# Patient Record
Sex: MALE | Race: OTHER | NOT HISPANIC OR LATINO | URBAN - METROPOLITAN AREA
[De-identification: names, ages, dates, MRNs, and addresses within clinical notes are randomized per-mention and may not be internally consistent; named-entity substitution may affect disease eponyms.]

---

## 2020-09-28 ENCOUNTER — INPATIENT (INPATIENT)
Facility: HOSPITAL | Age: 75
LOS: 0 days | Discharge: HOME | DRG: 244 | End: 2020-09-29
Attending: INTERNAL MEDICINE | Admitting: INTERNAL MEDICINE
Payer: COMMERCIAL

## 2020-09-28 VITALS — RESPIRATION RATE: 20 BRPM | DIASTOLIC BLOOD PRESSURE: 76 MMHG | HEART RATE: 44 BPM | SYSTOLIC BLOOD PRESSURE: 191 MMHG

## 2020-09-28 DIAGNOSIS — Z95.1 PRESENCE OF AORTOCORONARY BYPASS GRAFT: Chronic | ICD-10-CM

## 2020-09-28 DIAGNOSIS — Z98.49 CATARACT EXTRACTION STATUS, UNSPECIFIED EYE: Chronic | ICD-10-CM

## 2020-09-28 DIAGNOSIS — I49.9 CARDIAC ARRHYTHMIA, UNSPECIFIED: ICD-10-CM

## 2020-09-28 LAB
A1C WITH ESTIMATED AVERAGE GLUCOSE RESULT: 8.6 % — HIGH (ref 4–5.6)
ALBUMIN SERPL ELPH-MCNC: 4 G/DL — SIGNIFICANT CHANGE UP (ref 3.3–5)
ALP SERPL-CCNC: 114 U/L — SIGNIFICANT CHANGE UP (ref 40–120)
ALT FLD-CCNC: 25 U/L — SIGNIFICANT CHANGE UP (ref 10–45)
ANION GAP SERPL CALC-SCNC: 12 MMOL/L — SIGNIFICANT CHANGE UP (ref 5–17)
APTT BLD: 35.4 SEC — SIGNIFICANT CHANGE UP (ref 27.5–35.5)
AST SERPL-CCNC: 20 U/L — SIGNIFICANT CHANGE UP (ref 10–40)
BILIRUB SERPL-MCNC: 0.5 MG/DL — SIGNIFICANT CHANGE UP (ref 0.2–1.2)
BLD GP AB SCN SERPL QL: NEGATIVE — SIGNIFICANT CHANGE UP
BUN SERPL-MCNC: 20 MG/DL — SIGNIFICANT CHANGE UP (ref 7–23)
CALCIUM SERPL-MCNC: 9.2 MG/DL — SIGNIFICANT CHANGE UP (ref 8.4–10.5)
CHLORIDE SERPL-SCNC: 108 MMOL/L — SIGNIFICANT CHANGE UP (ref 96–108)
CK MB CFR SERPL CALC: 2.4 NG/ML — SIGNIFICANT CHANGE UP (ref 0–6.7)
CK SERPL-CCNC: 61 U/L — SIGNIFICANT CHANGE UP (ref 30–200)
CO2 SERPL-SCNC: 23 MMOL/L — SIGNIFICANT CHANGE UP (ref 22–31)
CREAT SERPL-MCNC: 1.18 MG/DL — SIGNIFICANT CHANGE UP (ref 0.5–1.3)
ESTIMATED AVERAGE GLUCOSE: 200 MG/DL — HIGH (ref 68–114)
GLUCOSE BLDC GLUCOMTR-MCNC: 139 MG/DL — HIGH (ref 70–99)
GLUCOSE BLDC GLUCOMTR-MCNC: 141 MG/DL — HIGH (ref 70–99)
GLUCOSE SERPL-MCNC: 139 MG/DL — HIGH (ref 70–99)
HCT VFR BLD CALC: 41.1 % — SIGNIFICANT CHANGE UP (ref 39–50)
HGB BLD-MCNC: 12.9 G/DL — LOW (ref 13–17)
INR BLD: 1.12 RATIO — SIGNIFICANT CHANGE UP (ref 0.88–1.16)
MAGNESIUM SERPL-MCNC: 1.8 MG/DL — SIGNIFICANT CHANGE UP (ref 1.6–2.6)
MCHC RBC-ENTMCNC: 28.7 PG — SIGNIFICANT CHANGE UP (ref 27–34)
MCHC RBC-ENTMCNC: 31.4 GM/DL — LOW (ref 32–36)
MCV RBC AUTO: 91.5 FL — SIGNIFICANT CHANGE UP (ref 80–100)
NRBC # BLD: 0 /100 WBCS — SIGNIFICANT CHANGE UP (ref 0–0)
NT-PROBNP SERPL-SCNC: 3408 PG/ML — HIGH (ref 0–300)
PHOSPHATE SERPL-MCNC: 2.7 MG/DL — SIGNIFICANT CHANGE UP (ref 2.5–4.5)
PLATELET # BLD AUTO: 227 K/UL — SIGNIFICANT CHANGE UP (ref 150–400)
POTASSIUM SERPL-MCNC: 4.2 MMOL/L — SIGNIFICANT CHANGE UP (ref 3.5–5.3)
POTASSIUM SERPL-SCNC: 4.2 MMOL/L — SIGNIFICANT CHANGE UP (ref 3.5–5.3)
PROT SERPL-MCNC: 6.8 G/DL — SIGNIFICANT CHANGE UP (ref 6–8.3)
PROTHROM AB SERPL-ACNC: 13.2 SEC — SIGNIFICANT CHANGE UP (ref 10.6–13.6)
RBC # BLD: 4.49 M/UL — SIGNIFICANT CHANGE UP (ref 4.2–5.8)
RBC # FLD: 12.8 % — SIGNIFICANT CHANGE UP (ref 10.3–14.5)
RH IG SCN BLD-IMP: POSITIVE — SIGNIFICANT CHANGE UP
SARS-COV-2 RNA SPEC QL NAA+PROBE: SIGNIFICANT CHANGE UP
SODIUM SERPL-SCNC: 143 MMOL/L — SIGNIFICANT CHANGE UP (ref 135–145)
TROPONIN T, HIGH SENSITIVITY RESULT: 23 NG/L — SIGNIFICANT CHANGE UP (ref 0–51)
WBC # BLD: 9.53 K/UL — SIGNIFICANT CHANGE UP (ref 3.8–10.5)
WBC # FLD AUTO: 9.53 K/UL — SIGNIFICANT CHANGE UP (ref 3.8–10.5)

## 2020-09-28 PROCEDURE — 93010 ELECTROCARDIOGRAM REPORT: CPT

## 2020-09-28 PROCEDURE — 99223 1ST HOSP IP/OBS HIGH 75: CPT | Mod: 57

## 2020-09-28 PROCEDURE — 93306 TTE W/DOPPLER COMPLETE: CPT | Mod: 26

## 2020-09-28 PROCEDURE — 99291 CRITICAL CARE FIRST HOUR: CPT

## 2020-09-28 RX ORDER — DEXTROSE 50 % IN WATER 50 %
25 SYRINGE (ML) INTRAVENOUS ONCE
Refills: 0 | Status: DISCONTINUED | OUTPATIENT
Start: 2020-09-28 | End: 2020-09-29

## 2020-09-28 RX ORDER — HYDRALAZINE HCL 50 MG
25 TABLET ORAL ONCE
Refills: 0 | Status: COMPLETED | OUTPATIENT
Start: 2020-09-28 | End: 2020-09-28

## 2020-09-28 RX ORDER — ALBUTEROL 90 UG/1
2 AEROSOL, METERED ORAL EVERY 6 HOURS
Refills: 0 | Status: DISCONTINUED | OUTPATIENT
Start: 2020-09-28 | End: 2020-09-29

## 2020-09-28 RX ORDER — ASPIRIN/CALCIUM CARB/MAGNESIUM 324 MG
81 TABLET ORAL DAILY
Refills: 0 | Status: DISCONTINUED | OUTPATIENT
Start: 2020-09-28 | End: 2020-09-28

## 2020-09-28 RX ORDER — DEXTROSE 50 % IN WATER 50 %
15 SYRINGE (ML) INTRAVENOUS ONCE
Refills: 0 | Status: DISCONTINUED | OUTPATIENT
Start: 2020-09-28 | End: 2020-09-29

## 2020-09-28 RX ORDER — CLOPIDOGREL BISULFATE 75 MG/1
75 TABLET, FILM COATED ORAL DAILY
Refills: 0 | Status: DISCONTINUED | OUTPATIENT
Start: 2020-09-21 | End: 2020-09-29

## 2020-09-28 RX ORDER — ASPIRIN/CALCIUM CARB/MAGNESIUM 324 MG
81 TABLET ORAL DAILY
Refills: 0 | Status: DISCONTINUED | OUTPATIENT
Start: 2020-09-28 | End: 2020-09-29

## 2020-09-28 RX ORDER — CHLORHEXIDINE GLUCONATE 213 G/1000ML
1 SOLUTION TOPICAL
Refills: 0 | Status: DISCONTINUED | OUTPATIENT
Start: 2020-09-28 | End: 2020-09-29

## 2020-09-28 RX ORDER — TAMSULOSIN HYDROCHLORIDE 0.4 MG/1
0.4 CAPSULE ORAL AT BEDTIME
Refills: 0 | Status: DISCONTINUED | OUTPATIENT
Start: 2020-09-28 | End: 2020-09-29

## 2020-09-28 RX ORDER — GLUCAGON INJECTION, SOLUTION 0.5 MG/.1ML
1 INJECTION, SOLUTION SUBCUTANEOUS ONCE
Refills: 0 | Status: DISCONTINUED | OUTPATIENT
Start: 2020-09-28 | End: 2020-09-29

## 2020-09-28 RX ORDER — INSULIN LISPRO 100/ML
VIAL (ML) SUBCUTANEOUS
Refills: 0 | Status: DISCONTINUED | OUTPATIENT
Start: 2020-09-28 | End: 2020-09-29

## 2020-09-28 RX ORDER — INSULIN LISPRO 100/ML
VIAL (ML) SUBCUTANEOUS AT BEDTIME
Refills: 0 | Status: DISCONTINUED | OUTPATIENT
Start: 2020-09-28 | End: 2020-09-29

## 2020-09-28 RX ORDER — DEXTROSE 50 % IN WATER 50 %
12.5 SYRINGE (ML) INTRAVENOUS ONCE
Refills: 0 | Status: DISCONTINUED | OUTPATIENT
Start: 2020-09-28 | End: 2020-09-29

## 2020-09-28 RX ORDER — CLOPIDOGREL BISULFATE 75 MG/1
75 TABLET, FILM COATED ORAL DAILY
Refills: 0 | Status: DISCONTINUED | OUTPATIENT
Start: 2020-09-28 | End: 2020-09-28

## 2020-09-28 RX ORDER — ATORVASTATIN CALCIUM 80 MG/1
40 TABLET, FILM COATED ORAL AT BEDTIME
Refills: 0 | Status: DISCONTINUED | OUTPATIENT
Start: 2020-09-28 | End: 2020-09-29

## 2020-09-28 RX ORDER — CHLORHEXIDINE GLUCONATE 213 G/1000ML
1 SOLUTION TOPICAL
Refills: 0 | Status: COMPLETED | OUTPATIENT
Start: 2020-09-28 | End: 2020-09-29

## 2020-09-28 RX ORDER — TIOTROPIUM BROMIDE 18 UG/1
1 CAPSULE ORAL; RESPIRATORY (INHALATION) DAILY
Refills: 0 | Status: DISCONTINUED | OUTPATIENT
Start: 2020-09-28 | End: 2020-09-29

## 2020-09-28 RX ORDER — FUROSEMIDE 40 MG
20 TABLET ORAL ONCE
Refills: 0 | Status: COMPLETED | OUTPATIENT
Start: 2020-09-28 | End: 2020-09-28

## 2020-09-28 RX ORDER — SODIUM CHLORIDE 9 MG/ML
1000 INJECTION, SOLUTION INTRAVENOUS
Refills: 0 | Status: DISCONTINUED | OUTPATIENT
Start: 2020-09-28 | End: 2020-09-29

## 2020-09-28 RX ORDER — MONTELUKAST 4 MG/1
10 TABLET, CHEWABLE ORAL DAILY
Refills: 0 | Status: DISCONTINUED | OUTPATIENT
Start: 2020-09-28 | End: 2020-09-29

## 2020-09-28 RX ORDER — MAGNESIUM SULFATE 500 MG/ML
2 VIAL (ML) INJECTION ONCE
Refills: 0 | Status: COMPLETED | OUTPATIENT
Start: 2020-09-28 | End: 2020-09-28

## 2020-09-28 RX ADMIN — TIOTROPIUM BROMIDE 1 CAPSULE(S): 18 CAPSULE ORAL; RESPIRATORY (INHALATION) at 18:54

## 2020-09-28 RX ADMIN — Medication 25 MILLIGRAM(S): at 18:27

## 2020-09-28 RX ADMIN — Medication 20 MILLIGRAM(S): at 20:45

## 2020-09-28 RX ADMIN — ATORVASTATIN CALCIUM 40 MILLIGRAM(S): 80 TABLET, FILM COATED ORAL at 21:05

## 2020-09-28 RX ADMIN — Medication 50 GRAM(S): at 20:45

## 2020-09-28 RX ADMIN — TAMSULOSIN HYDROCHLORIDE 0.4 MILLIGRAM(S): 0.4 CAPSULE ORAL at 21:05

## 2020-09-28 RX ADMIN — CHLORHEXIDINE GLUCONATE 1 APPLICATION(S): 213 SOLUTION TOPICAL at 20:46

## 2020-09-28 NOTE — PATIENT PROFILE ADULT - NSPROPASSIVESMOKEEXPOSURE_GEN_A_NUR
[] [address]  [address]    [phone]  F: [phone]   [] [address] [facility] [address]   [address]  P: [phone]  F: [phone]  [x] [place]  [address] [facility]  P: [phone]  F: [phone]  [] [address]  [facility]   [place]: [phone]  F: [phone]   [] [place] [id number] [facility] [address]  [place]: [phone]   F: [phone]     Physical Therapy Cancel/No Show note    Date: 2020  Patient: Arabella Bishop  : 1986  MRN: [id number]    Cancels/No Shows to date:     For today's appointment patient:    [x]  Cancelled    [] Rescheduled appointment    [] No-show     Reason given by patient:    [x]  Patient ill    []  Conflicting appointment    [] No transportation      [] Conflict with work    [] No reason given    [] Weather related    [] Other:      Comments:  Pt has strep    [] Next appointment was confirmed    Electronically signed by: Michelle Salas No

## 2020-09-28 NOTE — H&P ADULT - NSHPLABSRESULTS_GEN_ALL_CORE
12.9   9.53  )-----------( 227      ( 28 Sep 2020 17:13 )             41.1     09-28    143  |  108  |  20  ----------------------------<  139<H>  4.2   |  23  |  1.18    Ca    9.2      28 Sep 2020 17:13  Phos  2.7     09-28  Mg     1.8     09-28    TPro  6.8  /  Alb  4.0  /  TBili  0.5  /  DBili  x   /  AST  20  /  ALT  25  /  AlkPhos  114  09-28    Creatine Kinase, Serum: 61 U/L (09.28.20 @ 17:13)  Serum Pro-Brain Natriuretic Peptide: 3408 pg/mL (09.28.20 @ 17:13) 12.9   9.53  )-----------( 227      ( 28 Sep 2020 17:13 )             41.1     09-28    143  |  108  |  20  ----------------------------<  139<H>  4.2   |  23  |  1.18    Ca    9.2      28 Sep 2020 17:13  Phos  2.7     09-28  Mg     1.8     09-28    TPro  6.8  /  Alb  4.0  /  TBili  0.5  /  DBili  x   /  AST  20  /  ALT  25  /  AlkPhos  114  09-28    Creatine Kinase, Serum: 61 U/L (09.28.20 @ 17:13)  Serum Pro-Brain Natriuretic Peptide: 3408 pg/mL (09.28.20 @ 17:13)    EKG:  Bradycardia, 2:1 rhythm, RBBB 12.9   9.53  )-----------( 227      ( 28 Sep 2020 17:13 )             41.1     09-28    143  |  108  |  20  ----------------------------<  139<H>  4.2   |  23  |  1.18    Ca    9.2      28 Sep 2020 17:13  Phos  2.7     09-28  Mg     1.8     09-28    TPro  6.8  /  Alb  4.0  /  TBili  0.5  /  DBili  x   /  AST  20  /  ALT  25  /  AlkPhos  114  09-28    Creatine Kinase, Serum: 61 U/L (09.28.20 @ 17:13)  Serum Pro-Brain Natriuretic Peptide: 3408 pg/mL (09.28.20 @ 17:13)    TSH WNL    EKG:  Bradycardia, 2:1 rhythm, RBBB 12.9   9.53  )-----------( 227      ( 28 Sep 2020 17:13 )             41.1     09-28    143  |  108  |  20  ----------------------------<  139<H>  4.2   |  23  |  1.18    Ca    9.2      28 Sep 2020 17:13  Phos  2.7     09-28  Mg     1.8     09-28    TPro  6.8  /  Alb  4.0  /  TBili  0.5  /  DBili  x   /  AST  20  /  ALT  25  /  AlkPhos  114  09-28    Creatine Kinase, Serum: 61 U/L (09.28.20 @ 17:13)  Serum Pro-Brain Natriuretic Peptide: 3408 pg/mL (09.28.20 @ 17:13)    TSH WNL    EKG:  Bradycardia, 2:1 rhythm, RBBB, Left anterior fascicular block.

## 2020-09-28 NOTE — H&P ADULT - NSICDXPASTMEDICALHX_GEN_ALL_CORE_FT
PAST MEDICAL HISTORY:  Asthma     BPH (benign prostatic hyperplasia)     Cataract     GERD (gastroesophageal reflux disease)     HLD (hyperlipidemia)     HTN (hypertension)     Obesity     Prostate cancer Radiation tx    Type 2 diabetes mellitus     Umbilical hernia

## 2020-09-28 NOTE — CONSULT NOTE ADULT - REASON FOR ADMISSION
LUIS CALLING FROM [facility] / WANT TO KNOW IF SURGERY /  OUT PATIENT  / OR IN PATIENT / PLS ADV Bradycardia

## 2020-09-28 NOTE — H&P ADULT - NSHPREVIEWOFSYSTEMS_GEN_ALL_CORE
CONSTITUTIONAL: + weakness. No fevers or chills  EYES/ENT: No visual changes;  No throat pain   NECK: No pain  RESPIRATORY: No cough, hemoptysis  CARDIOVASCULAR: No chest pain or palpitations. + Chest tightness. No lower extremity swelling.   GASTROINTESTINAL: No abdominal or epigastric pain. No nausea, vomiting. No diarrhea or constipation. No melena or hematochezia.  GENITOURINARY: No dysuria  NEUROLOGICAL: No numbness or tingling, no headaches. + dizziness  All other review of systems is negative unless indicated above.

## 2020-09-28 NOTE — CONSULT NOTE ADULT - ASSESSMENT
75 year old Obese former smoking male, primary language Czech PMHx asthma, T2DM, HTN, HLD CAD s/p 1 vessel  CABG (2016) LAD, transfer to [facility] for symptomatic bradycardia dizziness, weakness, chest tightness, and JACOBO for 2 weeks. EKG with CHB with ventricular escape rate in the 40's    #1 EKG with CHB with ventricular escape rate in the 40's  monitor on telemetry per CICU care   NPO after midnight for pacing device implant tomorrow   follow up ECHO check LV function   type and screen is done  Hibiclens to chest wall 10 pm and 6 am   hold heparin/Lovenox  products in AM   No heparin or Lovenox products after implant due to high risk of pocket hematoma    [id number]   75 year old Obese former smoking male, primary language Latvian PMHx asthma, T2DM, HTN, HLD CAD s/p vessel  CABG (2016)  transfer to [facility] for symptomatic bradycardia dizziness, weakness, chest tightness, and JACOBO for 2 weeks. EKG with CHB with ventricular escape rate in the 40's    #1 CHB with ventricular escape rate in the 40's  - monitor on telemetry per CICU care   - NPO after midnight for pacing device implant tomorrow   - follow up ECHO check LV function   - type and screen is done  - Hibiclens to chest wall 10 pm and 6 am   - hold heparin/Lovenox  products in AM   - No heparin or Lovenox products after implant due to high risk of pocket hematoma  - please obtain 12 lead EKG  [id number]

## 2020-09-28 NOTE — H&P ADULT - NSHPPHYSICALEXAM_GEN_ALL_CORE
PHYSICAL EXAM:  Vital Signs Last 24 Hrs  T(C): 36.5 (09-28-20 @ 16:45)  T(F): 97.7 (09-28-20 @ 16:45), Max: 97.7 (09-28-20 @ 16:45)  HR: 40 (09-28-20 @ 17:30) (40 - 44)  BP: 161/61 (09-28-20 @ 17:30)  BP(mean): 96 (09-28-20 @ 17:30) (90 - 118)  RR: 22 (09-28-20 @ 17:30) (20 - 22)  SpO2: 99% (09-28-20 @ 17:30) (98% - 99%)  Wt(kg): --    Constitutional: NAD, awake and alert  EYES: EOMI. R pupil w post-surgical changes, nonreactive  ENT:  Normal Hearing, no tonsillar exudates, moist mucous membranes  Neck: Soft and supple , no thyromegaly   Respiratory: Scattered wheezes bilaterally  Cardiovascular: S1 and S2, slow, regular rhythm, no Murmurs, gallops or rubs, no JVD.   Gastrointestinal: Bowel Sounds present, soft, nontender, nondistended, no guarding, no rebound  Extremities: No cyanosis or clubbing; warm to touch. No LE edema.   Vascular: 2+ peripheral pulses lower ex. Varicose veins  Neurological: No focal deficits, CN II-XII intact bilaterally, sensation to light touch intact in all extremities.  Musculoskeletal: 5/5 strength b/l upper and lower extremities; no joint swelling.  Skin: No rashes. Well-healed   Psych:  AAOx3  HEME: no bruises, no nose bleeds PHYSICAL EXAM:  Vital Signs Last 24 Hrs  T(C): 36.5 (09-28-20 @ 16:45)  T(F): 97.7 (09-28-20 @ 16:45), Max: 97.7 (09-28-20 @ 16:45)  HR: 40 (09-28-20 @ 17:30) (40 - 44)  BP: 161/61 (09-28-20 @ 17:30)  BP(mean): 96 (09-28-20 @ 17:30) (90 - 118)  RR: 22 (09-28-20 @ 17:30) (20 - 22)  SpO2: 99% (09-28-20 @ 17:30) (98% - 99%)  Wt(kg): --    Constitutional: NAD, awake and alert  EYES: EOMI. R pupil w post-surgical changes, nonreactive  ENT:  Normal Hearing, no tonsillar exudates, moist mucous membranes  Neck: Soft and supple , no thyromegaly   Respiratory: Scattered wheezes bilaterally  Cardiovascular: S1 and S2, slow, regular rhythm, no Murmurs, gallops or rubs, + JVD with +HJR  Gastrointestinal: Bowel Sounds present, soft, nontender, nondistended, no guarding, no rebound  Extremities: No cyanosis or clubbing; warm to touch. No LE edema.   Vascular: 2+ peripheral pulses lower ex. Varicose veins  Neurological: No focal deficits, CN II-XII intact bilaterally, sensation to light touch intact in all extremities.  Musculoskeletal: 5/5 strength b/l upper and lower extremities; no joint swelling.  Skin: No rashes. Well-healed   Psych:  AAOx3  HEME: no bruises, no nose bleeds

## 2020-09-28 NOTE — H&P ADULT - ATTENDING COMMENTS
I have personally seen, examined and participated in the care of this patient. I have reviewed all pertinent clinical information, including history, physical exam, plan and the resident's note. I agree with the resident's note with the following additions:    Admitted with AV block with RBBB and LAFB - for pacemaker tomorrow  Narrow QRS with ventricular rate in 40s, hypertensive and asymptomatic - defer temporary pacemaker  Check TTE  Hypertensive - hold beta blocker, start Hydralazine prn  O2 sats mid to high 90s on room air  NPO  Normal renal function, minimally volume up on exam - challenge with Lasix  H/H low but acceptable  Afebrile, no antibiotics  Sugars controlled    The patient required critical care management and I personally provided 35 minutes of non-continuous care to the patient concurrently with the resident/fellow/nurse practitioner, excluding separate procedures and time spent teaching, in addition to discussing the patient and plan at length with the CICU staff and helping coordinate care.

## 2020-09-28 NOTE — CONSULT NOTE ADULT - SUBJECTIVE AND OBJECTIVE BOX
Chief Complaint :  dizziness and SOB x 2 weeks     HPI: This is a 75 year old Obese former smoking male, primary language Ecuadorean PMHx asthma, T2DM, HTN, HLD CAD s/p 1 vessel  CABG (2016) LAD, presents to [facility] from  Dr  office for symptomatic bradycardia.  Patient  and girlfriend report patient with N/V last week with diarrhea this week. Patient states SOB x 2 weeks, chest pain was not like the chest pain he had before the CABG in 2016. Reports having felt lightheadedness, dizziness, weakness, chest tightness, and shortness of breath with exertion for 3 days. Yesterday, he missed [employer] because he was feeling ill. Reports feeling like he was having an asthma attack. This AM, went to his doctor, who sent him to  due to bradycardia. At OSH, noted to be bradycardic to 40, BP 150s/60s. Received nebulizer for asthma, reports he felt better after this. Transferred to [facility] CICU for EP eval.     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 1000 milliLiter(s) (50 mL/Hr) IV Continuous <Continuous>  dextrose 50% Injectable 12.5 Gram(s) IV Push once  dextrose 50% Injectable 25 Gram(s) IV Push once  dextrose 50% Injectable 25 Gram(s) IV Push once  hydrALAZINE 25 milliGRAM(s) Oral once  insulin lispro (HumaLOG) corrective regimen sliding scale   SubCutaneous three times a day before meals  insulin lispro (HumaLOG) corrective regimen sliding scale   SubCutaneous at bedtime  montelukast 10 milliGRAM(s) Oral daily  tamsulosin 0.4 milliGRAM(s) Oral at bedtime  tiotropium 18 MICROgram(s) Capsule 1 Capsule(s) Inhalation daily    MEDICATIONS  (PRN):  ALBUTerol    90 MICROgram(s) HFA Inhaler 2 Puff(s) Inhalation every 6 hours PRN Shortness of Breath and/or Wheezing  dextrose 40% Gel 15 Gram(s) Oral once PRN Blood Glucose LESS THAN 70 milliGRAM(s)/deciliter  glucagon  Injectable 1 milliGRAM(s) IntraMuscular once PRN Glucose LESS THAN 70 milligrams/deciliter    Allergies   No Known Allergies    Intolerances   REVIEW OF SYSTEM:    Constitutional: denies fever, chills, fatigue  Neuro: denies headache, numbness, + weakness, + dizziness  Resp: denies cough, wheezing, + shortness of breath  CVS: + chest pain, palpitations, leg swelling  GI: denies abdominal pain, nausea, vomiting, diarrhea   : denies dysuria, frequency, incontinence  Skin: denies itching, burning, rashes, or lesions   Msk: denies joint pain or swelling     Vital Signs Last 24 Hrs  T(C): 36.5 (28 Sep 2020 16:45), Max: 36.5 (28 Sep 2020 16:45)  T(F): 97.7 (28 Sep 2020 16:45), Max: 97.7 (28 Sep 2020 16:45)  HR: 42 (28 Sep 2020 18:00) (40 - 44)  BP: 170/77 (28 Sep 2020 18:00) (161/61 - 191/76)  BP(mean): 119 (28 Sep 2020 18:00) (90 - 119)  RR: 22 (28 Sep 2020 18:00) (20 - 22)  SpO2: 98% (28 Sep 2020 18:00) (98% - 99%)    Physical Exam:  General : Obese lying in hospital  and no acute distress  Neuro : Alert and oriented x 3, no focal deficits  HEENT : Sclera clear, no JVD, no Lymphadenopathy no carotid bruits, neck supple  Lungs: significantly diminished throughout , no wheezing, no rhonchi  Cardiovascular : + 1 +2, RRR, no murmurs, bradycardic  GI : obese abdomen soft, NT, ND, + BS   : no suprapubic tenderness  Extremities : No edema, + 2 DP and +2 PT, feet warm   Skin : warm dry    TELE: CHB with ventricular escape rate in the 40's  EKG: SR CHB with ventricular escape rate in the 40's,  ms    LABS:                        12.9   9.53  )-----------( 227      ( 28 Sep 2020 17:13 )             41.1     09-28    143  |  108  |  20  ----------------------------<  139<H>  4.2   |  23  |  1.18    Ca    9.2      28 Sep 2020 17:13  Phos  2.7     09-28  Mg     1.8     09-28    TPro  6.8  /  Alb  4.0  /  TBili  0.5  /  DBili  x   /  AST  20  /  ALT  25  /  AlkPhos  114  09-28    PT/INR - ( 28 Sep 2020 17:17 )   PT: 13.2 sec;   INR: 1.12 ratio      PTT - ( 28 Sep 2020 17:17 )  PTT:35.4 sec  Type + Screen (09.28.20 @ 17:22)   ABO Interpretation: O   Rh Interpretation: Positive   Antibody Screen: Negative     RADIOLOGY & ADDITIONAL STUDIES:               Chief Complaint :  dizziness and SOB x 2 weeks     HPI: This is a 75 year old Obese former smoking male, primary language Egyptian PMHx asthma, T2DM, HTN, HLD CAD s/p 2 vessel  CABG (2016)   , presents to [facility] from  Dr  office for symptomatic bradycardia.  Patient  and girlfriend report patient with N/V last week with diarrhea this week. Patient states SOB x 2 weeks, chest pain was not like the chest pain he had before the CABG in 2016. Reports having felt lightheadedness, dizziness, weakness, chest tightness, and shortness of breath with exertion for 3 days. Yesterday, he missed [employer] because he was feeling ill. Reports feeling like he was having an asthma attack. This AM, went to his doctor, who sent him to  due to bradycardia. At OSH, noted to be bradycardic to 40, BP 150s/60s. Received nebulizer for asthma, reports he felt better after this. Transferred to [facility] CICU for EP eval.     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 1000 milliLiter(s) (50 mL/Hr) IV Continuous <Continuous>  dextrose 50% Injectable 12.5 Gram(s) IV Push once  dextrose 50% Injectable 25 Gram(s) IV Push once  dextrose 50% Injectable 25 Gram(s) IV Push once  hydrALAZINE 25 milliGRAM(s) Oral once  insulin lispro (HumaLOG) corrective regimen sliding scale   SubCutaneous three times a day before meals  insulin lispro (HumaLOG) corrective regimen sliding scale   SubCutaneous at bedtime  montelukast 10 milliGRAM(s) Oral daily  tamsulosin 0.4 milliGRAM(s) Oral at bedtime  tiotropium 18 MICROgram(s) Capsule 1 Capsule(s) Inhalation daily    MEDICATIONS  (PRN):  ALBUTerol    90 MICROgram(s) HFA Inhaler 2 Puff(s) Inhalation every 6 hours PRN Shortness of Breath and/or Wheezing  dextrose 40% Gel 15 Gram(s) Oral once PRN Blood Glucose LESS THAN 70 milliGRAM(s)/deciliter  glucagon  Injectable 1 milliGRAM(s) IntraMuscular once PRN Glucose LESS THAN 70 milligrams/deciliter    Allergies   No Known Allergies    Intolerances   REVIEW OF SYSTEM:    Constitutional: denies fever, chills, fatigue  Neuro: denies headache, numbness, + weakness, + dizziness  Resp: denies cough, wheezing, + shortness of breath  CVS: + chest pain, palpitations, leg swelling  GI: denies abdominal pain, nausea, vomiting, diarrhea   : denies dysuria, frequency, incontinence  Skin: denies itching, burning, rashes, or lesions   Msk: denies joint pain or swelling     Vital Signs Last 24 Hrs  T(C): 36.5 (28 Sep 2020 16:45), Max: 36.5 (28 Sep 2020 16:45)  T(F): 97.7 (28 Sep 2020 16:45), Max: 97.7 (28 Sep 2020 16:45)  HR: 42 (28 Sep 2020 18:00) (40 - 44)  BP: 170/77 (28 Sep 2020 18:00) (161/61 - 191/76)  BP(mean): 119 (28 Sep 2020 18:00) (90 - 119)  RR: 22 (28 Sep 2020 18:00) (20 - 22)  SpO2: 98% (28 Sep 2020 18:00) (98% - 99%)    Physical Exam:  General : Obese lying in hospital  and no acute distress  Neuro : Alert and oriented x 3, no focal deficits  HEENT : Sclera clear, no JVD, no Lymphadenopathy no carotid bruits, neck supple  Lungs: significantly diminished throughout , no wheezing, no rhonchi  Cardiovascular : + 1 +2, RRR, no murmurs, bradycardic  GI : obese abdomen soft, NT, ND, + BS   : no suprapubic tenderness  Extremities : No edema, + 2 DP and +2 PT, feet warm   Skin : warm dry    TELE: CHB with ventricular escape rate in the 40's  EKG: SR CHB with ventricular escape rate in the 40's,  ms    LABS:                        12.9   9.53  )-----------( 227      ( 28 Sep 2020 17:13 )             41.1     09-28    143  |  108  |  20  ----------------------------<  139<H>  4.2   |  23  |  1.18    Ca    9.2      28 Sep 2020 17:13  Phos  2.7     09-28  Mg     1.8     09-28    TPro  6.8  /  Alb  4.0  /  TBili  0.5  /  DBili  x   /  AST  20  /  ALT  25  /  AlkPhos  114  09-28    PT/INR - ( 28 Sep 2020 17:17 )   PT: 13.2 sec;   INR: 1.12 ratio      PTT - ( 28 Sep 2020 17:17 )  PTT:35.4 sec  Type + Screen (09.28.20 @ 17:22)   ABO Interpretation: O   Rh Interpretation: Positive   Antibody Screen: Negative     RADIOLOGY & ADDITIONAL STUDIES:               Chief Complaint :  dizziness and SOB x 2 weeks     HPI: This is a 75 year old Obese former smoking male, primary language Estonian PMHx asthma, T2DM, HTN, HLD CAD s/p 2 vessel  CABG (2016) , presents to [facility] from  Dr  office for symptomatic bradycardia.  Patient  and girlfriend report patient with N/V last week with diarrhea this week. Patient states SOB x 2 weeks, chest pain was not like the chest pain he had before the CABG in 2016. Reports having felt lightheadedness, dizziness, weakness, chest tightness, and shortness of breath with exertion for 3 days. Yesterday, he missed [employer] because he was feeling ill. Reports feeling like he was having an asthma attack. This AM, went to his doctor, who sent him to  due to bradycardia. At OSH, noted to be bradycardic to 40, BP 150s/60s. Received nebulizer for asthma, reports he felt better after this. Transferred to [facility] CICU for EP eval.     PAST MEDICAL & SURGICAL HISTORY:  Cataract  Asthma  Type 2 diabetes mellitus  Umbilical hernia  Obesity  BPH (benign prostatic hyperplasia)  Prostate cancer  Radiation tx  GERD (gastroesophageal reflux disease)  HLD (hyperlipidemia)  HTN (hypertension)  S/P cataract surgery  S/P CABG x 2, 03/14/2016-  LIMA-LAD, JEAN CARLOSG-OM       SOCIAL HISTORY: girlfriend, former smoker   FAMILY HISTORY:  No pertinent family history in first degree relatives    MEDICATIONS  (STANDING):  atorvastatin 40 milliGRAM(s) Oral at bedtime  chlorhexidine 2% Cloths 1 Application(s) Topical <User Schedule>  dextrose 5%. 1000 milliLiter(s) (50 mL/Hr) IV Continuous <Continuous>  dextrose 50% Injectable 12.5 Gram(s) IV Push once  dextrose 50% Injectable 25 Gram(s) IV Push once  dextrose 50% Injectable 25 Gram(s) IV Push once  hydrALAZINE 25 milliGRAM(s) Oral once  insulin lispro (HumaLOG) corrective regimen sliding scale   SubCutaneous three times a day before meals  insulin lispro (HumaLOG) corrective regimen sliding scale   SubCutaneous at bedtime  montelukast 10 milliGRAM(s) Oral daily  tamsulosin 0.4 milliGRAM(s) Oral at bedtime  tiotropium 18 MICROgram(s) Capsule 1 Capsule(s) Inhalation daily    MEDICATIONS  (PRN):  ALBUTerol    90 MICROgram(s) HFA Inhaler 2 Puff(s) Inhalation every 6 hours PRN Shortness of Breath and/or Wheezing  dextrose 40% Gel 15 Gram(s) Oral once PRN Blood Glucose LESS THAN 70 milliGRAM(s)/deciliter  glucagon  Injectable 1 milliGRAM(s) IntraMuscular once PRN Glucose LESS THAN 70 milligrams/deciliter    Allergies   No Known Allergies    Intolerances   REVIEW OF SYSTEM:    Constitutional: denies fever, chills, fatigue  Neuro: denies headache, numbness, + weakness, + dizziness  Resp: denies cough, wheezing, + shortness of breath  CVS: + chest pain, palpitations, leg swelling  GI: denies abdominal pain, nausea, vomiting, diarrhea   : denies dysuria, frequency, incontinence  Skin: denies itching, burning, rashes, or lesions   Msk: denies joint pain or swelling     Vital Signs Last 24 Hrs  T(C): 36.5 (28 Sep 2020 16:45), Max: 36.5 (28 Sep 2020 16:45)  T(F): 97.7 (28 Sep 2020 16:45), Max: 97.7 (28 Sep 2020 16:45)  HR: 42 (28 Sep 2020 18:00) (40 - 44)  BP: 170/77 (28 Sep 2020 18:00) (161/61 - 191/76)  BP(mean): 119 (28 Sep 2020 18:00) (90 - 119)  RR: 22 (28 Sep 2020 18:00) (20 - 22)  SpO2: 98% (28 Sep 2020 18:00) (98% - 99%)    Physical Exam:  General : Obese lying in hospital  and no acute distress  Neuro : Alert and oriented x 3, no focal deficits  HEENT : Sclera clear, no JVD, no Lymphadenopathy no carotid bruits, neck supple  Lungs: significantly diminished throughout , no wheezing, no rhonchi  Cardiovascular : + 1 +2, RRR, no murmurs, bradycardic  GI : obese abdomen soft, NT, ND, + BS   : no suprapubic tenderness  Extremities : No edema, + 2 DP and +2 PT, feet warm   Skin : warm dry    TELE: CHB with ventricular escape rate in the 40's  EKG: SR CHB with ventricular escape rate in the 40's,  ms    LABS:                        12.9   9.53  )-----------( 227      ( 28 Sep 2020 17:13 )             41.1     09-28    143  |  108  |  20  ----------------------------<  139<H>  4.2   |  23  |  1.18    Ca    9.2      28 Sep 2020 17:13  Phos  2.7     09-28  Mg     1.8     09-28    TPro  6.8  /  Alb  4.0  /  TBili  0.5  /  DBili  x   /  AST  20  /  ALT  25  /  AlkPhos  114  09-28    PT/INR - ( 28 Sep 2020 17:17 )   PT: 13.2 sec;   INR: 1.12 ratio      PTT - ( 28 Sep 2020 17:17 )  PTT:35.4 sec  Type + Screen (09.28.20 @ 17:22)   ABO Interpretation: O   Rh Interpretation: Positive   Antibody Screen: Negative     RADIOLOGY & ADDITIONAL STUDIES:

## 2020-09-28 NOTE — PATIENT PROFILE ADULT - NSPROMUTANXFEARADDRESSFT_GEN_A_NUR
I have personally evaluated and examined the patient. The Attending was available to me as a supervising provider if needed.
none

## 2020-09-28 NOTE — H&P ADULT - HISTORY OF PRESENT ILLNESS
75M PMHx asthma, HTN, DM, CAD s/p CABG (2013) presents from OSH for symptomatic bradycardia. Reports having felt lightheadedness, dizziness, weakness, chest tightness, and shortness of breath with exertion for 3 days. Yesterday, he missed [employer] because he was feeling ill. Reports feeling like he was having an asthma attack. This AM, went to his doctor, who sent him to [facility] due to bradycardia. At OSH, noted to be bradycardic to 40, BP 150s/60s. Received nebulizer for asthma, reports he felt better after this. Transferred to [facility] CICU for EP eval.     On arrival, T 97.7, HR 44, /76, RR 21, SpO2 99% on RA.

## 2020-09-28 NOTE — CHART NOTE - NSCHARTNOTEFT_GEN_A_CORE
Padua Prediction Score for VTE Risk within 24hours of admission:    Active malignancy:                                                    [  ] YES +3, [ X ] NO   Previous VTE (Excluding Superficial Vein Thrombosis): [  ] YES +3, [ X ] NO  Reduced mobility:                                                     [  ] YES +3, [X  ] NO  Already known thrombophilic condition:                     [  ] YES +3, [ X ] NO  Recent (</=1 month trauma and/or surgery):             [  ] YES +2, [  X] NO  Elderly age (>/=70):                                                  [ X ] YES +1, [  ] NO  Heart and/or Respiratory Failure:                               [ X ] YES +1, [  ] NO   Acute MI and/or ischemic CVA:                                  [  ] YES +1, [ X ] NO   Acute infection and/or rheumatologic disorder:          [  ] YES +1, [X  ] NO   BMI>/= 30:                                                              [  ] YES +1, [X  ] NO   Ongoing hormonal treatment:                                   [  ] YES +1, [X  ] NO    Total Score: [2  ]  points    [ X ] Padua Score <  3: Low Risk of VTE         - Chemical Thromboprophylaxis should be considered on case-by-case basis  [  ] Padua Score >/= 4: High Risk of VTE         - Chemical Thromboprophylaxis is recommended for nonpregnant patients without contraindications (Major bleeding, thrombocytopenia) who are >/=  18 years of age                         VTE Prophylaxis Recommendations:  Mechanical Pneumatic Compression Devices                                [X  ]  Yes,  [  ] No, Contraindicated    Chemical VTE Prophylaxis (Heparin/ Lovenox/ Fondaparinux)        [   ] Yes,  [ X ] No              [X ] Contraindicated, because PENDING PPM IN AM              [ ] Already receiving Systemic Anticoagulation

## 2020-09-29 ENCOUNTER — TRANSCRIPTION ENCOUNTER (OUTPATIENT)
Age: 75
End: 2020-09-29

## 2020-09-29 VITALS
RESPIRATION RATE: 17 BRPM | SYSTOLIC BLOOD PRESSURE: 127 MMHG | OXYGEN SATURATION: 97 % | HEART RATE: 68 BPM | DIASTOLIC BLOOD PRESSURE: 61 MMHG

## 2020-09-29 LAB
A1C WITH ESTIMATED AVERAGE GLUCOSE RESULT: 8.8 % — HIGH (ref 4–5.6)
ALBUMIN SERPL ELPH-MCNC: 3.1 G/DL — LOW (ref 3.3–5)
ALP SERPL-CCNC: 93 U/L — SIGNIFICANT CHANGE UP (ref 40–120)
ALT FLD-CCNC: 19 U/L — SIGNIFICANT CHANGE UP (ref 10–45)
ANION GAP SERPL CALC-SCNC: 10 MMOL/L — SIGNIFICANT CHANGE UP (ref 5–17)
AST SERPL-CCNC: 12 U/L — SIGNIFICANT CHANGE UP (ref 10–40)
BASOPHILS # BLD AUTO: 0.03 K/UL — SIGNIFICANT CHANGE UP (ref 0–0.2)
BASOPHILS NFR BLD AUTO: 0.4 % — SIGNIFICANT CHANGE UP (ref 0–2)
BILIRUB SERPL-MCNC: 0.6 MG/DL — SIGNIFICANT CHANGE UP (ref 0.2–1.2)
BUN SERPL-MCNC: 19 MG/DL — SIGNIFICANT CHANGE UP (ref 7–23)
CALCIUM SERPL-MCNC: 8.7 MG/DL — SIGNIFICANT CHANGE UP (ref 8.4–10.5)
CHLORIDE SERPL-SCNC: 108 MMOL/L — SIGNIFICANT CHANGE UP (ref 96–108)
CHOLEST SERPL-MCNC: 179 MG/DL — SIGNIFICANT CHANGE UP (ref 10–199)
CK MB CFR SERPL CALC: 1.5 NG/ML — SIGNIFICANT CHANGE UP (ref 0–6.7)
CK SERPL-CCNC: 42 U/L — SIGNIFICANT CHANGE UP (ref 30–200)
CO2 SERPL-SCNC: 22 MMOL/L — SIGNIFICANT CHANGE UP (ref 22–31)
CREAT SERPL-MCNC: 1.18 MG/DL — SIGNIFICANT CHANGE UP (ref 0.5–1.3)
EOSINOPHIL # BLD AUTO: 0.19 K/UL — SIGNIFICANT CHANGE UP (ref 0–0.5)
EOSINOPHIL NFR BLD AUTO: 2.4 % — SIGNIFICANT CHANGE UP (ref 0–6)
ESTIMATED AVERAGE GLUCOSE: 206 MG/DL — HIGH (ref 68–114)
GLUCOSE BLDC GLUCOMTR-MCNC: 109 MG/DL — HIGH (ref 70–99)
GLUCOSE BLDC GLUCOMTR-MCNC: 111 MG/DL — HIGH (ref 70–99)
GLUCOSE SERPL-MCNC: 112 MG/DL — HIGH (ref 70–99)
HCT VFR BLD CALC: 35.7 % — LOW (ref 39–50)
HCV AB S/CO SERPL IA: 0.11 S/CO — SIGNIFICANT CHANGE UP (ref 0–0.99)
HCV AB SERPL-IMP: SIGNIFICANT CHANGE UP
HDLC SERPL-MCNC: 38 MG/DL — LOW
HGB BLD-MCNC: 11.6 G/DL — LOW (ref 13–17)
IMM GRANULOCYTES NFR BLD AUTO: 0.5 % — SIGNIFICANT CHANGE UP (ref 0–1.5)
LIPID PNL WITH DIRECT LDL SERPL: 115 MG/DL — HIGH
LYMPHOCYTES # BLD AUTO: 1.86 K/UL — SIGNIFICANT CHANGE UP (ref 1–3.3)
LYMPHOCYTES # BLD AUTO: 23.2 % — SIGNIFICANT CHANGE UP (ref 13–44)
MAGNESIUM SERPL-MCNC: 2.3 MG/DL — SIGNIFICANT CHANGE UP (ref 1.6–2.6)
MCHC RBC-ENTMCNC: 29.5 PG — SIGNIFICANT CHANGE UP (ref 27–34)
MCHC RBC-ENTMCNC: 32.5 GM/DL — SIGNIFICANT CHANGE UP (ref 32–36)
MCV RBC AUTO: 90.8 FL — SIGNIFICANT CHANGE UP (ref 80–100)
MONOCYTES # BLD AUTO: 0.76 K/UL — SIGNIFICANT CHANGE UP (ref 0–0.9)
MONOCYTES NFR BLD AUTO: 9.5 % — SIGNIFICANT CHANGE UP (ref 2–14)
NEUTROPHILS # BLD AUTO: 5.13 K/UL — SIGNIFICANT CHANGE UP (ref 1.8–7.4)
NEUTROPHILS NFR BLD AUTO: 64 % — SIGNIFICANT CHANGE UP (ref 43–77)
NRBC # BLD: 0 /100 WBCS — SIGNIFICANT CHANGE UP (ref 0–0)
PHOSPHATE SERPL-MCNC: 3.2 MG/DL — SIGNIFICANT CHANGE UP (ref 2.5–4.5)
PLATELET # BLD AUTO: 193 K/UL — SIGNIFICANT CHANGE UP (ref 150–400)
POTASSIUM SERPL-MCNC: 3.4 MMOL/L — LOW (ref 3.5–5.3)
POTASSIUM SERPL-SCNC: 3.4 MMOL/L — LOW (ref 3.5–5.3)
PROT SERPL-MCNC: 5.7 G/DL — LOW (ref 6–8.3)
RBC # BLD: 3.93 M/UL — LOW (ref 4.2–5.8)
RBC # FLD: 12.7 % — SIGNIFICANT CHANGE UP (ref 10.3–14.5)
SARS-COV-2 IGG SERPL QL IA: NEGATIVE — SIGNIFICANT CHANGE UP
SARS-COV-2 IGM SERPL IA-ACNC: 0.09 INDEX — SIGNIFICANT CHANGE UP
SODIUM SERPL-SCNC: 140 MMOL/L — SIGNIFICANT CHANGE UP (ref 135–145)
TOTAL CHOLESTEROL/HDL RATIO MEASUREMENT: 4.7 RATIO — SIGNIFICANT CHANGE UP (ref 3.4–9.6)
TRIGL SERPL-MCNC: 128 MG/DL — SIGNIFICANT CHANGE UP (ref 10–149)
TROPONIN T, HIGH SENSITIVITY RESULT: 27 NG/L — SIGNIFICANT CHANGE UP (ref 0–51)
TSH SERPL-MCNC: 3.95 UIU/ML — SIGNIFICANT CHANGE UP (ref 0.27–4.2)
WBC # BLD: 8.01 K/UL — SIGNIFICANT CHANGE UP (ref 3.8–10.5)
WBC # FLD AUTO: 8.01 K/UL — SIGNIFICANT CHANGE UP (ref 3.8–10.5)

## 2020-09-29 PROCEDURE — 93010 ELECTROCARDIOGRAM REPORT: CPT

## 2020-09-29 PROCEDURE — 82962 GLUCOSE BLOOD TEST: CPT

## 2020-09-29 PROCEDURE — 85610 PROTHROMBIN TIME: CPT

## 2020-09-29 PROCEDURE — U0003: CPT

## 2020-09-29 PROCEDURE — 84100 ASSAY OF PHOSPHORUS: CPT

## 2020-09-29 PROCEDURE — 84443 ASSAY THYROID STIM HORMONE: CPT

## 2020-09-29 PROCEDURE — 85027 COMPLETE CBC AUTOMATED: CPT

## 2020-09-29 PROCEDURE — 86803 HEPATITIS C AB TEST: CPT

## 2020-09-29 PROCEDURE — C1785: CPT

## 2020-09-29 PROCEDURE — 83880 ASSAY OF NATRIURETIC PEPTIDE: CPT

## 2020-09-29 PROCEDURE — 82553 CREATINE MB FRACTION: CPT

## 2020-09-29 PROCEDURE — 86769 SARS-COV-2 COVID-19 ANTIBODY: CPT

## 2020-09-29 PROCEDURE — 82550 ASSAY OF CK (CPK): CPT

## 2020-09-29 PROCEDURE — C1887: CPT

## 2020-09-29 PROCEDURE — 33208 INSRT HEART PM ATRIAL & VENT: CPT | Mod: KX

## 2020-09-29 PROCEDURE — 71046 X-RAY EXAM CHEST 2 VIEWS: CPT

## 2020-09-29 PROCEDURE — C1892: CPT

## 2020-09-29 PROCEDURE — C1898: CPT

## 2020-09-29 PROCEDURE — 93005 ELECTROCARDIOGRAM TRACING: CPT

## 2020-09-29 PROCEDURE — C1769: CPT

## 2020-09-29 PROCEDURE — 86900 BLOOD TYPING SEROLOGIC ABO: CPT

## 2020-09-29 PROCEDURE — 80053 COMPREHEN METABOLIC PANEL: CPT

## 2020-09-29 PROCEDURE — 85730 THROMBOPLASTIN TIME PARTIAL: CPT

## 2020-09-29 PROCEDURE — 99238 HOSP IP/OBS DSCHRG MGMT 30/<: CPT | Mod: GC

## 2020-09-29 PROCEDURE — 86901 BLOOD TYPING SEROLOGIC RH(D): CPT

## 2020-09-29 PROCEDURE — 85025 COMPLETE CBC W/AUTO DIFF WBC: CPT

## 2020-09-29 PROCEDURE — C8929: CPT

## 2020-09-29 PROCEDURE — 80061 LIPID PANEL: CPT

## 2020-09-29 PROCEDURE — 94640 AIRWAY INHALATION TREATMENT: CPT

## 2020-09-29 PROCEDURE — 83735 ASSAY OF MAGNESIUM: CPT

## 2020-09-29 PROCEDURE — 83036 HEMOGLOBIN GLYCOSYLATED A1C: CPT

## 2020-09-29 PROCEDURE — 33208 INSRT HEART PM ATRIAL & VENT: CPT

## 2020-09-29 PROCEDURE — 86850 RBC ANTIBODY SCREEN: CPT

## 2020-09-29 PROCEDURE — 99024 POSTOP FOLLOW-UP VISIT: CPT

## 2020-09-29 PROCEDURE — 71046 X-RAY EXAM CHEST 2 VIEWS: CPT | Mod: 26

## 2020-09-29 PROCEDURE — 84484 ASSAY OF TROPONIN QUANT: CPT

## 2020-09-29 RX ORDER — ALBUTEROL 90 UG/1
2 AEROSOL, METERED ORAL
Qty: 0 | Refills: 0 | DISCHARGE

## 2020-09-29 RX ORDER — MONTELUKAST 4 MG/1
1 TABLET, CHEWABLE ORAL
Qty: 0 | Refills: 0 | DISCHARGE

## 2020-09-29 RX ORDER — IPRATROPIUM/ALBUTEROL SULFATE 18-103MCG
0 AEROSOL WITH ADAPTER (GRAM) INHALATION
Qty: 0 | Refills: 0 | DISCHARGE

## 2020-09-29 RX ORDER — AMLODIPINE BESYLATE 2.5 MG/1
1 TABLET ORAL
Qty: 0 | Refills: 0 | DISCHARGE

## 2020-09-29 RX ORDER — CHOLECALCIFEROL (VITAMIN D3) 125 MCG
1 CAPSULE ORAL
Qty: 0 | Refills: 0 | DISCHARGE

## 2020-09-29 RX ORDER — LISINOPRIL 2.5 MG/1
1 TABLET ORAL
Qty: 0 | Refills: 0 | DISCHARGE

## 2020-09-29 RX ORDER — BUDESONIDE AND FORMOTEROL FUMARATE DIHYDRATE 160; 4.5 UG/1; UG/1
2 AEROSOL RESPIRATORY (INHALATION)
Qty: 0 | Refills: 0 | DISCHARGE

## 2020-09-29 RX ORDER — SITAGLIPTIN 50 MG/1
1 TABLET, FILM COATED ORAL
Qty: 0 | Refills: 0 | DISCHARGE

## 2020-09-29 RX ORDER — TAMSULOSIN HYDROCHLORIDE 0.4 MG/1
1 CAPSULE ORAL
Qty: 0 | Refills: 0 | DISCHARGE

## 2020-09-29 RX ORDER — METOPROLOL TARTRATE 50 MG
50 TABLET ORAL DAILY
Refills: 0 | Status: DISCONTINUED | OUTPATIENT
Start: 2020-09-29 | End: 2020-09-29

## 2020-09-29 RX ORDER — ASPIRIN/CALCIUM CARB/MAGNESIUM 324 MG
1 TABLET ORAL
Qty: 0 | Refills: 0 | DISCHARGE

## 2020-09-29 RX ORDER — CEFAZOLIN SODIUM 1 G
1000 VIAL (EA) INJECTION ONCE
Refills: 0 | Status: COMPLETED | OUTPATIENT
Start: 2020-09-29 | End: 2020-09-29

## 2020-09-29 RX ORDER — POTASSIUM CHLORIDE 20 MEQ
20 PACKET (EA) ORAL
Refills: 0 | Status: COMPLETED | OUTPATIENT
Start: 2020-09-29 | End: 2020-09-29

## 2020-09-29 RX ORDER — FLUTICASONE PROPIONATE AND SALMETEROL 50; 250 UG/1; UG/1
0 POWDER ORAL; RESPIRATORY (INHALATION)
Qty: 0 | Refills: 0 | DISCHARGE

## 2020-09-29 RX ORDER — CLOPIDOGREL BISULFATE 75 MG/1
1 TABLET, FILM COATED ORAL
Qty: 0 | Refills: 0 | DISCHARGE

## 2020-09-29 RX ORDER — DAPAGLIFLOZIN 10 MG/1
1 TABLET, FILM COATED ORAL
Qty: 0 | Refills: 0 | DISCHARGE

## 2020-09-29 RX ORDER — METOPROLOL TARTRATE 50 MG
1 TABLET ORAL
Qty: 0 | Refills: 0 | DISCHARGE

## 2020-09-29 RX ORDER — PANTOPRAZOLE SODIUM 20 MG/1
1 TABLET, DELAYED RELEASE ORAL
Qty: 0 | Refills: 0 | DISCHARGE

## 2020-09-29 RX ORDER — GLIMEPIRIDE 1 MG
0 TABLET ORAL
Qty: 0 | Refills: 0 | DISCHARGE

## 2020-09-29 RX ADMIN — Medication 20 MILLIEQUIVALENT(S): at 04:38

## 2020-09-29 RX ADMIN — CHLORHEXIDINE GLUCONATE 1 APPLICATION(S): 213 SOLUTION TOPICAL at 05:39

## 2020-09-29 RX ADMIN — Medication 81 MILLIGRAM(S): at 12:34

## 2020-09-29 RX ADMIN — Medication 100 MILLIGRAM(S): at 16:54

## 2020-09-29 RX ADMIN — CHLORHEXIDINE GLUCONATE 1 APPLICATION(S): 213 SOLUTION TOPICAL at 05:41

## 2020-09-29 RX ADMIN — TIOTROPIUM BROMIDE 1 CAPSULE(S): 18 CAPSULE ORAL; RESPIRATORY (INHALATION) at 08:40

## 2020-09-29 RX ADMIN — CLOPIDOGREL BISULFATE 75 MILLIGRAM(S): 75 TABLET, FILM COATED ORAL at 12:35

## 2020-09-29 RX ADMIN — Medication 50 MILLIGRAM(S): at 12:35

## 2020-09-29 RX ADMIN — Medication 20 MILLIEQUIVALENT(S): at 06:50

## 2020-09-29 RX ADMIN — MONTELUKAST 10 MILLIGRAM(S): 4 TABLET, CHEWABLE ORAL at 12:34

## 2020-09-29 RX ADMIN — Medication 20 MILLIEQUIVALENT(S): at 05:41

## 2020-09-29 NOTE — DISCHARGE NOTE PROVIDER - NSDCFUADDINST_GEN_ALL_CORE_FT
No lifting of affected arm over your head on the side of pacemaker for 2 weeks. No lifting / pushing more than 10 lbs for 6 weeks. No driving until you have your follow up appointment. Keep area dry for 5-7 days, then you may shower, but do not scrub the area. No submerging / swimming until the scar is formed. No golf / swimming / tennis for 6 weeks or until you are cleared by your physcian. Once the wound is completely healed, you can resume normal activities. If you are discharged with prescription antibiotics, make sure complete all doses.

## 2020-09-29 NOTE — DISCHARGE NOTE PROVIDER - HOSPITAL COURSE
HPI: 75M PMHx asthma, HTN, DM, CAD s/p CABG (2013) presents from OSH for symptomatic bradycardia. Reports having felt lightheadedness, dizziness, weakness, chest tightness, and shortness of breath with exertion for 3 days. Yesterday, he missed [facility] because he was feeling ill. Reports feeling like he was having an asthma attack. This AM, went to his doctor, who sent him to [facility] due to bradycardia. At OSH, noted to be bradycardic to 40, BP 150s/60s. Received nebulizer for asthma, reports he felt better after this. Transferred to [facility] CICU for EP eval.    CCU Course:  Remained HD stable w/o [id number] for TVP.  S/P BiV PPM Metronic on 9/29 HPI: 75M PMHx asthma, HTN, DM, CAD s/p CABG (2013) presents from OSH for symptomatic bradycardia. Reports having felt lightheadedness, dizziness, weakness, chest tightness, and shortness of breath with exertion for 3 days. Yesterday, he missed [facility] because he was feeling ill. Reports feeling like he was having an asthma attack. This AM, went to his doctor, who sent him to [facility] due to bradycardia. At OSH, noted to be bradycardic to 40, BP 150s/60s. Received nebulizer for asthma, reports he felt better after this. Transferred to [facility] CICU for EP eval.    CCU Course:  Remained HD stable w/o [id number] for TVP.  S/P Dual chamber PPM Metronic on 9/29 HPI: 75M PMHx asthma, HTN, DM, CAD s/p CABG (2013) presents from OSH for symptomatic bradycardia. Reports having felt lightheadedness, dizziness, weakness, chest tightness, and shortness of breath with exertion for 3 days. Yesterday, he missed [facility] because he was feeling ill. Reports feeling like he was having an asthma attack. This AM, went to his doctor, who sent him to [facility] due to bradycardia. At OSH, noted to be bradycardic to 40, BP 150s/60s. Received nebulizer for asthma, reports he felt better after this. Transferred to [facility] CICU for EP eval.    CCU Course:  Remained HD stable w/o indication for TVP.  S/P Dual chamber PPM Metronic on 9/29 HPI: 75M PMHx asthma, HTN, DM, CAD s/p CABG (2013) presents from OSH for symptomatic bradycardia. Reports having felt lightheadedness, dizziness, weakness, chest tightness, and shortness of breath with exertion for 3 days. Yesterday, he missed [facility] because he was feeling ill. Reports feeling like he was having an asthma attack. This AM, went to his doctor, who sent him to [facility] due to bradycardia. At OSH, noted to be bradycardic to 40, BP 150s/60s. Received nebulizer for asthma, reports he felt better after this. Transferred to [facility] CICU for EP eval.    CCU Course:  Remained HD stable w/o indication for TVP.  S/P Dual chamber PPM Medtronic on 9/29       - Medtronic Luz XT DR TURNER [id number]    After PPM placement, HD stable with HR in 80s, paced. Stable for discharge home with cardiology follow up.

## 2020-09-29 NOTE — PROGRESS NOTE ADULT - ASSESSMENT
75M PMHx HTN, DM, CAD s/p CABG (2013), asthma presents with symptomatic bradycardia concerning for high grade AV block.     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, LAFB, RBBB  - EP evaluation, PPM in AM  - NPO for PPM placement  - F/u TTE  - Pacemaker pads on patient  - Atropine at bedside    HTN  - Hold AVN blocking agents (BB)  - Hydralazine 25mg PO q8h  - Hold ACE-I i/s/o DANYA  - Monitor BPs  - 20 mg IV Lasix  - Hypervolemic on exam with elevated pro-BNP    CAD s/p CABG  - C/w Atorvastatin 40  - C/w ASA/Plavix    #Respiratory  Asthma  - Home inhaler therapeutic interchange    #Neuro  No acute issues, mentating well, A&Ox3    #GI  No acute issues  - Holding home pantoprazole for now  NPO after midnight    #Renal  DANYA  - Cr 1.2, no known kidney disease  - Monitor, avoid nephrotoxic agents  - Hold home lisinopril 20  - Gentle diuresis with Lasix    #ID  No sign of infection    #Endo  Type 2 Diabetes without insulin  - SSI  - F/u HbA1c    TSH WNL at OSH    #Heme  - Hold DVT PPx for PPM placement  - Per EP, okay with ASA/Plavix for PPM placement       75M PMHx HTN, DM, CAD s/p CABG (2013), asthma presents with symptomatic bradycardia concerning for high grade AV block.     #Cardiovascular  High grade AV block  - Initially 2:1 block, LAFB, RBBB  - s/p dual chamber pacemaker placed  - Received Medtronic Dual Chamber PPM this AM       - Medtronic Luz XT DR TURNER [id number]  - TTE with normal EF  - Will give 1 more dose of cefazolin today 5pm, discharge at 6pm if he remains stable  - PA/Lateral CXR today  - PPM site looks clean without hematoma    HTN  - Restarted home metoprolol  - Reinstate lisinopril for discharge  - Outpatient follow up for resumption of other antihypertensives  - Monitor BPs    CAD s/p CABG  - C/w Atorvastatin 40  - C/w ASA/Plavix    #Respiratory  Asthma  - Home inhaler therapeutic interchange  - DC on home inhalers    #Neuro  No acute issues, mentating well, A&Ox3    #GI  No acute issues    #Renal  DANYA  - Cr 1.2, stable above baseline, no known kidney disease, possibly new baseline  - Outpatient follow up    #ID  No sign of infection  - Ancef for infection PPx with new device    #Endo  Type 2 Diabetes without insulin  - SSI  - HbA1c 8.3%  - Resume home metformin and glimepiride on discharge  - TSH WNL    #Heme  - Per EP, okay with ASA/Plavix for PPM placement    #Dispo  - DC pending stability today

## 2020-09-29 NOTE — DISCHARGE NOTE PROVIDER - NSDCMRMEDTOKEN_GEN_ALL_CORE_FT
Advair HFA:   albuterol 90 mcg/inh inhalation aerosol: 2 puff(s) inhaled every 6 hours, As Needed  amLODIPine 5 mg oral tablet: 1 tab(s) orally once a day  aspirin 81 mg oral tablet: 1 tab(s) orally once a day  atorvastatin 40 mg oral tablet: 1 tab(s) orally once a day (at bedtime)  clopidogrel 75 mg oral tablet: 1 tab(s) orally once a day  Combivent:   glimepiride:   lisinopril 20 mg oral tablet: 1 tab(s) orally once a day  metFORMIN 1000 mg oral tablet: 1 tab(s) orally 2 times a day  metoprolol succinate 50 mg oral tablet, extended release: 1 tab(s) orally once a day  montelukast 10 mg oral tablet: 1 tab(s) orally once a day  pantoprazole 40 mg oral delayed release tablet: 1 tab(s) orally once a day  Symbicort 160 mcg-4.5 mcg/inh inhalation aerosol: 2 puff(s) inhaled once a day  tamsulosin 0.4 mg oral capsule: 1 cap(s) orally once a day   albuterol 90 mcg/inh inhalation aerosol: 2 puff(s) inhaled every 6 hours, As Needed  amLODIPine 5 mg oral tablet: 1 tab(s) orally once a day  aspirin 81 mg oral tablet: 1 tab(s) orally once a day  atorvastatin 40 mg oral tablet: 1 tab(s) orally once a day (at bedtime)  Farxiga 10 mg oral tablet: 1 tab(s) orally once a day  Januvia 100 mg oral tablet: 1 tab(s) orally once a day  metFORMIN 1000 mg oral tablet: 1 tab(s) orally 2 times a day  metoprolol succinate 50 mg oral tablet, extended release: 1 tab(s) orally once a day  montelukast 10 mg oral tablet: 1 tab(s) orally once a day  Symbicort 160 mcg-4.5 mcg/inh inhalation aerosol: 2 puff(s) inhaled 2 times a day  tamsulosin 0.4 mg oral capsule: 1 cap(s) orally once a day  Vitamin D3 50,000 intl units (1250 mcg) oral capsule: 1 cap(s) orally once a week

## 2020-09-29 NOTE — DISCHARGE NOTE PROVIDER - NSDCCPCAREPLAN_GEN_ALL_CORE_FT
To confirm, Your doctor has instructed you that surgery is scheduled for:     Please report to [facility] on [address] the morning of surgery.     Pre-Op will call the afternoon prior to surgery between 4:00 and 6:00 PM with the final arrival time.      PLEASE NOTE:  The surgery schedule has many variables which may affect the time of your surgery case.  Family members should be available if your surgery time changes.  Plan to be here the day of your procedure between 4-6 hours.    MEDICATIONS:   TAKE ONLY THESE MEDICATIONS WITH A SMALL SIP OF WATER THE MORNING OF YOUR PROCEDURE: Synthroid.    Hold Naproxen 5-7 days prior to surgery.      DO NOT TAKE THESE MEDICATIONS 5-7 DAYS PRIOR to your procedure or per your surgeon's request: ASPIRIN, ALEVE, ADVIL, IBUPROFEN, FISH OIL VITAMIN E, HERBALS  (May take Tylenol)    ONLY if you are prescribed any types of blood thinners such as:  Aspirin, Coumadin, Plavix, Pradaxa, Xarelto, Aggrenox, Effient, Eliquis, Savasya, Brilinta, or any other, ask your surgeon whether you should stop taking them and how long before surgery you should stop.  You may also need to verify with the prescribing physician if it is ok to stop your medication.      INSTRUCTIONS IMPORTANT!!  · Do not eat or drink anything between midnight and the time of your procedure- this includes gum, mints, and candy.  · ONLY if you are diabetic, check your sugar in the morning before your procedure.  · Do not smoke, vape or drink alcoholic beverages 24 hours prior to your procedure.  · Shower the night before AND the morning of your procedure with a Chlorhexidine wash such as Hibiclens or Dial antibacterial soap from the neck down.  Do not get it on your face or in your eyes.  You may use your own shampoo and face wash. This helps your skin to be as bacteria free as possible.    · If you wear contact lenses, dentures, hearing aids or glasses, bring a container to put them in during surgery and give to a  family member for safe keeping.  Please leave all jewelry, piercing's and valuables at home.   · DO NOT remove hair from the surgery site.  Do not shave the incision site unless you are given specific instructions to do so.    · ONLY if you have been diagnosed with sleep apnea please bring your C-PAP machine.  · ONLY if you wear home oxygen please bring your portable oxygen tank the day of your procedure.   · ONLY for patients requiring bowel prep, written instructions will be given by your doctor's office.  · ONLY if you have a neuro stimulator, please bring the controller with you the morning of surgery  · ONLY if a type and screen test is needed before surgery, please return:  · If your doctor has scheduled you for an overnight stay, bring a small overnight bag with any personal items you need.  · Make arrangements in advance for transportation home by a responsible adult.  · You must make arrangements for transportation, TAXI'S, UBER'S OR LYFTS ARE NOT ALLOWED.          If you have any questions about these instructions, call Pre-Op Admit  Nursing at [phone] or the Pre-Op Day Surgery Unit at [phone].       PRINCIPAL DISCHARGE DIAGNOSIS  Diagnosis: AVB (atrioventricular block)  Assessment and Plan of Treatment: No lifting of affected arm over your head on the side of pacemaker for 2 weeks. No lifting / pushing more than 10 lbs for 6 weeks. No driving until you have your follow up appointment. Keep area dry for 5-7 days, then you may shower, but do not scrub the area. No submerging / swimming until the scar is formed. No golf / swimming / tennis for 6 weeks or until you are cleared by your physcian. Once the wound is completely healed, you can resume normal activities. If you are discharged with prescription antibiotics, make sure complete all doses.      SECONDARY DISCHARGE DIAGNOSES  Diagnosis: CAD in native artery  Assessment and Plan of Treatment: Goal: You will remain chest pain free  Contiue with a low salt, low fat diet. Weight management. Take medications as prescribed. No smoking. Follow up appointments with your doctor(s)  as instruced.    Diagnosis: Diabetes  Assessment and Plan of Treatment: Goal: Your hemoglobin A1C will be between 7-8   Continue to follow with your primary care MD or your endocrinologist.  Follow a heart healthy diabetic diet. If you check your fingerstick glucose at home, call your MD if it is greater than 250mg/dL on 2 occasions or less than 100mg/dL on 2 occasions. Know signs of low blood sugar, such as: dizziness, shakiness, sweating, confusion, hunger, nervousness-drink 4 ounces apple juice if occurs and call your doctor. Know early signs of high blood sugar, such as: frequent urination, increased thirst, blurry vision, fatigue, headache - call your doctor if this occurs. Follow with other practitioners to care for your diabetes, such as ophthamologist and podiatrist.    Diagnosis: HTN (hypertension)  Assessment and Plan of Treatment: Goal: Your blood pressure will be controlled.   Continue with your blood pressure medications; eat a heart healthy diet with low salt diet; exercise regularly (consult with your physician or cardiologist first); maintain a heart healthy weight; if you smoke - quit (A resource to help you stop smoking is the [facility] Tobacco [facility] – phone number [phone].); include healthy ways to manage stress. Continue to follow with your primary care physician or cardiologist.     PRINCIPAL DISCHARGE DIAGNOSIS  Diagnosis: AVB (atrioventricular block)  Assessment and Plan of Treatment: No lifting of affected arm over your head on the side of pacemaker for 2 weeks. No lifting / pushing more than 10 lbs for 6 weeks. No driving until you have your follow up appointment. Keep area dry for 5-7 days, then you may shower, but do not scrub the area. No submerging / swimming until the scar is formed. No golf / swimming / tennis for 6 weeks or until you are cleared by your physcian. Once the wound is completely healed, you can resume normal activities. If you are discharged with prescription antibiotics, make sure complete all doses.  No levanta nunez brazo gualberto sobre nunez jeni por dos semanas. No levanta cosas mas que carmelita (10) libres por seis semanas. No maneje un jayson hasta despues de nunez juana con nunez cardiologo. Mantenga la parte del      SECONDARY DISCHARGE DIAGNOSES  Diagnosis: CAD in native artery  Assessment and Plan of Treatment: Goal: You will remain chest pain free  Contiue with a low salt, low fat diet. Weight management. Take medications as prescribed. No smoking. Follow up appointments with your doctor(s)  as instruced.    Diagnosis: Diabetes  Assessment and Plan of Treatment: Goal: Your hemoglobin A1C will be between 7-8   Continue to follow with your primary care MD or your endocrinologist.  Follow a heart healthy diabetic diet. If you check your fingerstick glucose at home, call your MD if it is greater than 250mg/dL on 2 occasions or less than 100mg/dL on 2 occasions. Know signs of low blood sugar, such as: dizziness, shakiness, sweating, confusion, hunger, nervousness-drink 4 ounces apple juice if occurs and call your doctor. Know early signs of high blood sugar, such as: frequent urination, increased thirst, blurry vision, fatigue, headache - call your doctor if this occurs. Follow with other practitioners to care for your diabetes, such as ophthamologist and podiatrist.    Diagnosis: HTN (hypertension)  Assessment and Plan of Treatment: Goal: Your blood pressure will be controlled.   Continue with your blood pressure medications; eat a heart healthy diet with low salt diet; exercise regularly (consult with your physician or cardiologist first); maintain a heart healthy weight; if you smoke - quit (A resource to help you stop smoking is the [facility] Tobacco [facility] – phone number [phone].); include healthy ways to manage stress. Continue to follow with your primary care physician or cardiologist.     PRINCIPAL DISCHARGE DIAGNOSIS  Diagnosis: AVB (atrioventricular block)  Assessment and Plan of Treatment: No lifting of affected arm over your head on the side of pacemaker for 2 weeks. No lifting / pushing more than 10 lbs for 6 weeks. No driving until you have your follow up appointment. Keep area dry for 5-7 days, then you may shower, but do not scrub the area. No submerging / swimming until the scar is formed. No golf / swimming / tennis for 6 weeks or until you are cleared by your physcian. Once the wound is completely healed, you can resume normal activities.   No levanta nunez brazo gualberto sobre nunez jeni por dos semanas. No levanta cosas mas que carmelita (10) libres por seis semanas. No maneje un jayson hasta despues de nunez juana con nunez cardiologo. Mantenga la parte de nunez pecho con la incision seca por 5-7 escudero. Despues de esto tiempo, puede ducharse, val no frote tessa parte. No se bana y no nada en agua hasta hace cicatriz. No juege golf o tenis por seis semanas o hasta nunez medico dice que se puede. Cuando la herida esta curada, puede hacer sanket actividades normales.      SECONDARY DISCHARGE DIAGNOSES  Diagnosis: CAD in native artery  Assessment and Plan of Treatment: Goal: You will remain chest pain free  Contiue with a low salt, low fat diet. Weight management. Take medications as prescribed. No smoking. Follow up appointments with your doctor(s) as instructed.  Debe comer brady dieta bajo en grasa y sal. Mantenga nunez peso con ejercisios cuando nunez medico dice que se puede. No fume. Sigue con nunez medco.    Diagnosis: Diabetes  Assessment and Plan of Treatment: Goal: Your hemoglobin A1C will be between 7-8   Continue to follow with your primary care MD or your endocrinologist.  Follow a heart healthy diabetic diet. If you check your fingerstick glucose at home, call your MD if it is greater than 250mg/dL on 2 occasions or less than 100mg/dL on 2 occasions. Know signs of low blood sugar, such as: dizziness, shakiness, sweating, confusion, hunger, nervousness-drink 4 ounces apple juice if occurs and call your doctor. Know early signs of high blood sugar, such as: frequent urination, increased thirst, blurry vision, fatigue, headache - call your doctor if this occurs. Follow with other practitioners to care for your diabetes, such as ophthamologist and podiatrist.  Usted tiene la diabetes. Continue con nunez medico para controlarla. Si cheque nunez azucar a casa, llama a nunez medico si esta mas que 250 dos veces, o si esta menos que 100 dos veces. Si hay sintomas de azucar baja, laney mareo, inestabilidad, sudar, confusion, hambre, nervosismo, deon 4 oncas de juga y llama a nunez medico. Si hay sintomas de azucar gonzalo, laney mucha orina, vista borrosa, fatiga, y dolor de jeni, llama a nunez medico.    Diagnosis: HTN (hypertension)  Assessment and Plan of Treatment: Goal: Your blood pressure will be controlled.   Continue with your blood pressure medications; eat a heart healthy diet with low salt diet; exercise regularly (consult with your physician or cardiologist first); maintain a heart healthy weight; if you smoke - quit (A resource to help you stop smoking is the [facility] Tobacco [facility] – phone number [phone].); include healthy ways to manage stress. Continue to follow with your primary care physician or cardiologist.  Hace brady juana con nunez medico para hablar de sanket medicamientos por presion gonzalo.

## 2020-09-29 NOTE — PROGRESS NOTE ADULT - SUBJECTIVE AND OBJECTIVE BOX
***INCOMPLETE NOTE***  Tadeo Reyes MD PGY-2  Internal Medicine  Pager [phone] / [phone] Tadeo Reyes MD PGY-2  Internal Medicine  Pager [phone] / [phone]    Admission date:  CHIEF COMPLAINT:  HPI:  75M PMHx asthma, HTN, DM, CAD s/p CABG (2013) presents from OSH for symptomatic bradycardia. Reports having felt lightheadedness, dizziness, weakness, chest tightness, and shortness of breath with exertion for 3 days. Yesterday, he missed [facility] because he was feeling ill. Reports feeling like he was having an asthma attack. This AM, went to his doctor, who sent him to [facility] due to bradycardia. At OSH, noted to be bradycardic to 40, BP 150s/60s. Received nebulizer for asthma, reports he felt better after this. Transferred to [facility] CICU for EP eval.     On arrival, T 97.7, HR 44, /76, RR 21, SpO2 99% on RA. (28 Sep 2020 17:32)    INTERVAL HISTORY:  - No acute events overnight, remained asymptomatic  - Underwent TTE last night  - Received Medtronic Dual Chamber PPM this AM       - Medtronic Otho XT DR TURNER [id number]    REVIEW OF SYSTEMS: Denies chest pain, SOB, ligthheadedness, dizziness, nausea, vomiting, abdominal pain, weakness; all others negative    MEDICATIONS  (STANDING):  aspirin  chewable 81 milliGRAM(s) Oral daily  atorvastatin 40 milliGRAM(s) Oral at bedtime  ceFAZolin   IVPB 1000 milliGRAM(s) IV Intermittent once  chlorhexidine 2% Cloths 1 Application(s) Topical <User Schedule>  clopidogrel Tablet 75 milliGRAM(s) Oral daily  dextrose 5%. 1000 milliLiter(s) (50 mL/Hr) IV Continuous <Continuous>  dextrose 50% Injectable 12.5 Gram(s) IV Push once  dextrose 50% Injectable 25 Gram(s) IV Push once  dextrose 50% Injectable 25 Gram(s) IV Push once  insulin lispro (HumaLOG) corrective regimen sliding scale   SubCutaneous three times a day before meals  insulin lispro (HumaLOG) corrective regimen sliding scale   SubCutaneous at bedtime  metoprolol succinate ER 50 milliGRAM(s) Oral daily  montelukast 10 milliGRAM(s) Oral daily  tamsulosin 0.4 milliGRAM(s) Oral at bedtime  tiotropium 18 MICROgram(s) Capsule 1 Capsule(s) Inhalation daily    MEDICATIONS  (PRN):  ALBUTerol    90 MICROgram(s) HFA Inhaler 2 Puff(s) Inhalation every 6 hours PRN Shortness of Breath and/or Wheezing  dextrose 40% Gel 15 Gram(s) Oral once PRN Blood Glucose LESS THAN 70 milliGRAM(s)/deciliter  glucagon  Injectable 1 milliGRAM(s) IntraMuscular once PRN Glucose LESS THAN 70 milligrams/deciliter      Objective:  ICU Vital Signs Last 24 Hrs  T(C): 36.9 (29 Sep 2020 11:10), Max: 37.3 (28 Sep 2020 19:00)  T(F): 98.4 (29 Sep 2020 11:10), Max: 99.2 (28 Sep 2020 19:00)  HR: 72 (29 Sep 2020 12:00) (36 - 82)  BP: 133/65 (29 Sep 2020 12:00) (115/49 - 191/76)  BP(mean): 105 (29 Sep 2020 11:39) (73 - 126)  ABP: --  ABP(mean): --  RR: 22 (29 Sep 2020 12:00) (17 - 30)  SpO2: 95% (29 Sep 2020 12:00) (94% - 99%)      Adult Advanced Hemodynamics Last 24 Hrs  CVP(mm Hg): --  CVP(cm H2O): --  CO: --  CI: --  PA: --  PA(mean): --  PCWP: --  SVR: --  SVRI: --  PVR: --  PVRI: --      09-28 @ 07:01  -  09-29 @ 07:00  --------------------------------------------------------  IN: 300 mL / OUT: 1250 mL / NET: -950 mL      Daily Height in cm: 167.64 (29 Sep 2020 09:40)    Daily   PHYSICAL EXAM:  Vital Signs Last 24 Hrs  T(C): 36.9 (09-29-20 @ 11:10)  T(F): 98.4 (09-29-20 @ 11:10), Max: 99.2 (09-28-20 @ 19:00)  HR: 72 (09-29-20 @ 12:00) (36 - 82)  BP: 133/65 (09-29-20 @ 12:00)  BP(mean): 105 (09-29-20 @ 11:39) (73 - 126)  RR: 22 (09-29-20 @ 12:00) (17 - 30)  SpO2: 95% (09-29-20 @ 12:00) (94% - 99%)  Wt(kg): --    09-28 @ 07:01  -  09-29 @ 07:00  --------------------------------------------------------  IN: 300 mL / OUT: 1250 mL / NET: -950 mL    Constitutional: NAD, awake and alert  EYES: EOMI. R pupil w post-surgical changes, nonreactive to light  ENT:  Normal Hearing, no tonsillar exudates, moist mucous membranes  Neck: Soft and supple , no thyromegaly, moist mucous membranes  Respiratory: clear to auscultation bilaterally  Cardiovascular: S1 and S2, slow, regular rhythm, no Murmurs, gallops or rubs, JVP not elevated.   Gastrointestinal: Bowel Sounds present, soft, nontender, nondistended, no guarding, no rebound  Extremities: No cyanosis or clubbing; warm to touch. No LE edema.   Vascular: 2+ peripheral pulses lower ex. Varicose veins  Neurological: No focal deficits, CN II-XII intact bilaterally, sensation to light touch intact in all extremities.  Musculoskeletal: 5/5 strength b/l upper and lower extremities; no joint swelling.  Skin: No rashes. Well-healed sternotomy scar. L upper chest dressing in place without shadowing.  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 Paced    IMAGING:  < from: TTE with Doppler (w/Cont) (09.28.20 @ 16:39) >  Conclusions:  Patient bradycardic to HRabout 40 bpm during the exam.  1. Normal mitral valve. Mild mitral regurgitation.  2. Calcified trileaflet aortic valve with normal opening.  Minimal aortic regurgitation.  3. Mildly dilated left atrium.LA volume index = 41 cc/m2.  4. Endocardial visualization enhanced with intravenous  injection of Ultrasonic Enhancing Agent (Definity).  Overall preserved left ventricular systolic function. The  mid lateral and mid inferolateral walls are hypokinetic.  The apex is dyskinetic. No left ventricular thrombus.  5. Estimated right ventricular systolic pressure equals 49  mm Hg, assuming right atrial pressure equals 8 mm Hg,  consistent with mild pulmonary hypertension.  *** No previous Echo exam.    < end of copied text >      Labs:                          11.6   8.01  )-----------( 193      ( 29 Sep 2020 04:08 )             35.7     09-29    140  |  108  |  19  ----------------------------<  112<H>  3.4<L>   |  22  |  1.18    Ca    8.7      29 Sep 2020 04:08  Phos  3.2     09-29  Mg     2.3     09-29    TPro  5.7<L>  /  Alb  3.1<L>  /  TBili  0.6  /  DBili  x   /  AST  12  /  ALT  19  /  AlkPhos  93  09-29    LIVER FUNCTIONS - ( 29 Sep 2020 04:08 )  Alb: 3.1 g/dL / Pro: 5.7 g/dL / ALK PHOS: 93 U/L / ALT: 19 U/L / AST: 12 U/L / GGT: x           PT/INR - ( 28 Sep 2020 17:17 )   PT: 13.2 sec;   INR: 1.12 ratio       PTT - ( 28 Sep 2020 17:17 )  PTT:35.4 sec    Creatine Kinase, Serum: 42 U/L (09-29 @ 04:08)  CKMB Units: 1.5 ng/mL (09-29 @ 04:08)  Creatine Kinase, Serum: 61 U/L (09-28 @ 17:13)  CKMB Units: 2.4 ng/mL (09-28 @ 17:13)    A1C with Estimated Average Glucose Result: 8.8%    HEALTH ISSUES - PROBLEM Dx:

## 2020-09-29 NOTE — PROGRESS NOTE ADULT - ATTENDING COMMENTS
I have personally seen, examined and participated in the care of this patient. I have reviewed all pertinent clinical information, including history, physical exam, plan and the resident's note. I agree with the resident's note with the following additions:    Admitted with AV block with RBBB and LAFB - for pacemaker today  Narrow QRS with ventricular rate in 40s, hypertensive and asymptomatic - defer temporary pacemaker  Hypertensive - restart outpatient antihypertensives post ppm  O2 sats mid to high 90s on room air  NPO  Normal renal function, compensated on exam - target even  H/H low but acceptable  Afebrile, no antibiotics  Sugars controlled    The patient required critical care management and I personally provided 35 minutes of non-continuous care to the patient concurrently with the resident/fellow/nurse practitioner, excluding separate procedures and time spent teaching, in addition to discussing the patient and plan at length with the CICU staff and helping coordinate care. I have personally seen, examined and participated in the care of this patient. I have reviewed all pertinent clinical information, including history, physical exam, plan and the resident's note. I agree with the resident's note with the following additions:    Admitted with AV block with RBBB and LAFB - for pacemaker today  Narrow QRS with ventricular rate in 40s, hypertensive and asymptomatic - defer temporary pacemaker  Hypertensive - restart outpatient antihypertensives post ppm  O2 sats mid to high 90s on room air  NPO  Normal renal function, compensated on exam - target even  H/H low but acceptable  Afebrile, no antibiotics  Sugars controlled

## 2020-09-29 NOTE — DISCHARGE NOTE PROVIDER - CARE PROVIDER_API CALL
Latnoia Berry  Cardiovascular Diseases  [address] [place] [id number]  Phone: [phone]  Fax: [phone]  Follow Up Time:

## 2020-09-29 NOTE — DISCHARGE NOTE PROVIDER - NSDCFUSCHEDAPPT_GEN_ALL_CORE_FT
PARAMJIT LEVY ; 10/08/2020 ; [facility] 300 [facility]  PARAMJIT LEVY ; 10/06/2020 ; [facility] PARAMJIT Caceres ; 10/08/2020 ; [facility]  PARAMJIT LEVY ; 10/06/2020 ; [facility]

## 2020-09-29 NOTE — DISCHARGE NOTE PROVIDER - NSDCFUADDAPPT_GEN_ALL_CORE_FT
Please follow up with Dr. Yi on Thursday 10/8 at 3:20pm for a wound/device check. Please follow up with Dr. Yi on Tuesday 10/6 at 9:40am for a wound/device check.

## 2020-09-29 NOTE — DISCHARGE NOTE NURSING/CASE MANAGEMENT/SOCIAL WORK - PATIENT PORTAL LINK FT
You can access the FollowMyHealth Patient Portal offered by [facility] by registering at the following website: [email or web address]. By joining [email or web address]’s FollowMyHealth portal, you will also be able to view your health information using other applications (apps) compatible with our system.

## 2020-09-29 NOTE — PHARMACOTHERAPY INTERVENTION NOTE - COMMENTS
Home medications verified with patient and pharmacy:     Home Medications:  albuterol 90 mcg/inh inhalation aerosol: 2 puff(s) inhaled every 6 hours, As Needed  amLODIPine 5 mg oral tablet: 1 tab(s) orally once a day  aspirin 81 mg oral tablet: 1 tab(s) orally once a day  atorvastatin 40 mg oral tablet: 1 tab(s) orally once a day (at bedtime)  Farxiga 10 mg oral tablet: 1 tab(s) orally once a day  Januvia 100 mg oral tablet: 1 tab(s) orally once a day  metFORMIN 1000 mg oral tablet: 1 tab(s) orally 2 times a day  metoprolol succinate 50 mg oral tablet, extended release: 1 tab(s) orally once a day  montelukast 10 mg oral tablet: 1 tab(s) orally once a day  Symbicort 160 mcg-4.5 mcg/inh inhalation aerosol: 2 puff(s) inhaled 2 times a day  tamsulosin 0.4 mg oral capsule: 1 cap(s) orally once a day  Vitamin D3 50,000 intl units (1250 mcg) oral capsule: 1 cap(s) orally once a week     > Updated patient's preferred pharmacy in [facility] ([facility]: [phone])  > Patient's home inhalers includes Symbicort (budesonide-formoterol). Currently Spiriva (tiotropium) is an inpatient order.   > Per pharmacy, patient does not pick medications up consistently as metoprolol was last filled in 2019, and amlodipine last filled in April x 90 days.   > Patient counseled on the importance of medication compliance. Patient verbalized he did not have further questions regarding his medications.     Amy Oliver PharmSallyD., [facility]  Clinical Pharmacy Coordinator  [phone]

## 2020-09-29 NOTE — PROGRESS NOTE ADULT - PROVIDER SPECIALTY LIST ADULT
Dr. Goodman aware aware of nausea.  No order received.  Will continue to monitor.   Electrophysiology

## 2020-09-29 NOTE — PROGRESS NOTE ADULT - SUBJECTIVE AND OBJECTIVE BOX
24H hour events:     MEDICATIONS:  aspirin  chewable 81 milliGRAM(s) Oral daily  clopidogrel Tablet 75 milliGRAM(s) Oral daily  tamsulosin 0.4 milliGRAM(s) Oral at bedtime      ALBUTerol    90 MICROgram(s) HFA Inhaler 2 Puff(s) Inhalation every 6 hours PRN  montelukast 10 milliGRAM(s) Oral daily  tiotropium 18 MICROgram(s) Capsule 1 Capsule(s) Inhalation daily        atorvastatin 40 milliGRAM(s) Oral at bedtime  dextrose 40% Gel 15 Gram(s) Oral once PRN  dextrose 50% Injectable 12.5 Gram(s) IV Push once  dextrose 50% Injectable 25 Gram(s) IV Push once  dextrose 50% Injectable 25 Gram(s) IV Push once  glucagon  Injectable 1 milliGRAM(s) IntraMuscular once PRN  insulin lispro (HumaLOG) corrective regimen sliding scale   SubCutaneous three times a day before meals  insulin lispro (HumaLOG) corrective regimen sliding scale   SubCutaneous at bedtime    chlorhexidine 2% Cloths 1 Application(s) Topical <User Schedule>  dextrose 5%. 1000 milliLiter(s) IV Continuous <Continuous>      REVIEW OF SYSTEMS:  See HPI, otherwise ROS negative.    PHYSICAL EXAM:  T(C): 36.7 (20 @ 09:40), Max: 37.3 (20 @ 19:00)  HR: 36 (20 @ 09:40) (36 - 46)  BP: 123/56 (20 @ 09:40) (115/49 - 191/76)  RR: 17 (20 @ 09:40) (17 - 29)  SpO2: 98% (20 @ 09:40) (94% - 99%)  Wt(kg): --  I&O's Summary    28 Sep 2020 07:01  -  29 Sep 2020 07:00  --------------------------------------------------------  IN: 300 mL / OUT: 1250 mL / NET: -950 mL        Appearance: Alert. NAD	  Cardiovascular: +S1S2 RRR no m/g/r  Respiratory: CTA B/L	  Psychiatry: A & O x 3, Mood & affect appropriate  Gastrointestinal:  Soft, NT. ND. +BS	  Skin: No rashes	  Neurologic: Non-focal  Extremities: No edema BLE  Vascular: Peripheral pulses palpable 2+ bilaterally      LABS:	 	    CBC Full  -  ( 29 Sep 2020 04:08 )  WBC Count : 8.01 K/uL  Hemoglobin : 11.6 g/dL  Hematocrit : 35.7 %  Platelet Count - Automated : 193 K/uL  Mean Cell Volume : 90.8 fl  Mean Cell Hemoglobin : 29.5 pg  Mean Cell Hemoglobin Concentration : 32.5 gm/dL  Auto Neutrophil # : 5.13 K/uL  Auto Lymphocyte # : 1.86 K/uL  Auto Monocyte # : 0.76 K/uL  Auto Eosinophil # : 0.19 K/uL  Auto Basophil # : 0.03 K/uL  Auto Neutrophil % : 64.0 %  Auto Lymphocyte % : 23.2 %  Auto Monocyte % : 9.5 %  Auto Eosinophil % : 2.4 %  Auto Basophil % : 0.4 %        140  |  108  |  19  ----------------------------<  112<H>  3.4<L>   |  22  |  1.18      143  |  108  |  20  ----------------------------<  139<H>  4.2   |  23  |  1.18    Ca    8.7      29 Sep 2020 04:08  Ca    9.2      28 Sep 2020 17:13  Phos  3.2       Phos  2.7       Mg     2.3       Mg     1.8         TPro  5.7<L>  /  Alb  3.1<L>  /  TBili  0.6  /  DBili  x   /  AST  12  /  ALT  19  /  AlkPhos  93    TPro  6.8  /  Alb  4.0  /  TBili  0.5  /  DBili  x   /  AST  20  /  ALT  25  /  AlkPhos  114        proBNP: Serum Pro-Brain Natriuretic Peptide: 3408 pg/mL ( @ 17:13)    Lipid Profile:   HgA1c:   TSH:       CARDIAC MARKERS:      Creatine Kinase, Serum: 42 U/L (20 @ 04:08)  Creatine Kinase, Serum: 61 U/L (20 @ 17:13)      TELEMETRY:   	    EC;1 AVB QRS ~ 120 ms    RADIOLOGY:  eo< from: TTE with Doppler (w/Cont) (20 @ 16:39) >  ------------------------------------------------------------------------  Dimensions:    Normal Values:  LA:     4.5    2.0 - 4.0 cm  Ao:     3.3    2.0 - 3.8 cm  SEPTUM: 1.1    0.6 - 1.2 cm  PWT:    0.9    0.6 - 1.1 cm  LVIDd:  4.6    3.0 - 5.6 cm  LVIDs:  2.4    1.8 - 4.0 cm  Derived variables:  LVMI: 91 g/m2  RWT: 0.39  Fractional short: 48 %  EF (Visual Estimate): 55-60 %  Doppler Peak Velocity (m/sec): AoV=1.8  ------------------------------------------------------------------------  Observations:  Mitral Valve: Normal mitral valve. Mild mitral  regurgitation.  Aortic Valve/Aorta: Calcified trileaflet aortic valve with  normal opening. Peak transaortic valve gradient equals 13  mm Hg, mean transaortic valve gradient equals 7 mm Hg,  aortic valve velocity time integral equals 49 cm. Minimal  aortic regurgitation.  Peak left ventricular outflow tract  gradient equals 5 mm Hg, LVOT velocity time integral equals  26 cm.  Aortic Root: 3.3 cm.  Left Atrium: Mildly dilated left atrium.LA volume index =  41 cc/m2.  Left Ventricle: Endocardial visualization enhanced with  intravenous injection of Ultrasonic Enhancing Agent  (Definity). Overall preserved left ventricular systolic  function. The mid lateral and mid inferolateral wallsare  hypokinetic. The apex is dyskinetic. No left ventricular  thrombus. Normal left ventricular internal dimensions and  wall thicknesses. Indeterminate diastolic function.  Right Heart: Normal right atrium. The right ventricle is  not well visualized; grossly normal right ventricular  systolic function. Normal tricuspid valve. Mild tricuspid  regurgitation. Pulmonic valve not well visualized.  Pericardium/Pleura: Normal pericardium with no pericardial  effusion.  Hemodynamic: Estimated right atrial pressure is 8 mm Hg.  Estimated right ventricular systolic pressure equals 49 mm  Hg, assuming right atrial pressure equals 8 mm Hg,  consistent with mild pulmonary hypertension.  ------------------------------------------------------------------------  Conclusions:  Patient bradycardic to HRabout 40 bpm during the exam.  1. Normal mitral valve. Mild mitral regurgitation.  2. Calcified trileaflet aortic valve with normal opening.  Minimal aortic regurgitation.  3. Mildly dilated left atrium.LA volume index = 41 cc/m2.  4. Endocardial visualization enhanced with intravenous  injection of Ultrasonic Enhancing Agent (Definity).  Overall preserved left ventricular systolic function. The  mid lateral and mid inferolateral walls are hypokinetic.  The apex is dyskinetic. No left ventricular thrombus.  5. Estimated right ventricular systolic pressure equals 49  mm Hg, assuming right atrial pressure equals 8 mm Hg,  consistent with mild pulmonary hypertension.  *** No previous Echo exam.  ------------------------------------------------------------------------  Confirmed on  2020 - 08:54:07 by Titus Martinez M.D.  ----------------------------------------------------      	       24H hour events:     MEDICATIONS:  aspirin  chewable 81 milliGRAM(s) Oral daily  clopidogrel Tablet 75 milliGRAM(s) Oral daily  tamsulosin 0.4 milliGRAM(s) Oral at bedtime    ALBUTerol    90 MICROgram(s) HFA Inhaler 2 Puff(s) Inhalation every 6 hours PRN  montelukast 10 milliGRAM(s) Oral daily  tiotropium 18 MICROgram(s) Capsule 1 Capsule(s) Inhalation daily  atorvastatin 40 milliGRAM(s) Oral at bedtime  dextrose 40% Gel 15 Gram(s) Oral once PRN  dextrose 50% Injectable 12.5 Gram(s) IV Push once  dextrose 50% Injectable 25 Gram(s) IV Push once  dextrose 50% Injectable 25 Gram(s) IV Push once  glucagon  Injectable 1 milliGRAM(s) IntraMuscular once PRN  insulin lispro (HumaLOG) corrective regimen sliding scale   SubCutaneous three times a day before meals  insulin lispro (HumaLOG) corrective regimen sliding scale   SubCutaneous at bedtime    chlorhexidine 2% Cloths 1 Application(s) Topical <User Schedule>  dextrose 5%. 1000 milliLiter(s) IV Continuous <Continuous>      REVIEW OF SYSTEMS:  See HPI, otherwise ROS negative.    PHYSICAL EXAM:  T(C): 36.7 (20 @ 09:40), Max: 37.3 (20 @ 19:00)  HR: 36 (20 @ 09:40) (36 - 46)  BP: 123/56 (20 @ 09:40) (115/49 - 191/76)  RR: 17 (20 @ 09:40) (17 - 29)  SpO2: 98% (20 @ 09:40) (94% - 99%)  Wt(kg): --  I&O's Summary    28 Sep 2020 07:01  -  29 Sep 2020 07:00  --------------------------------------------------------  IN: 300 mL / OUT: 1250 mL / NET: -950 mL    Appearance: Alert. NAD	  Cardiovascular: +S1S2 RRR no m/g/r  Respiratory: CTA B/L	  Psychiatry: A & O x 3, Mood & affect appropriate  Gastrointestinal:  Soft, NT. ND. +BS	  Skin: No rashes	  Neurologic: Non-focal  Extremities: No edema BLE  Vascular: Peripheral pulses palpable 2+ bilaterally  LABS:	 	    CBC Full  -  ( 29 Sep 2020 04:08 )  WBC Count : 8.01 K/uL  Hemoglobin : 11.6 g/dL  Hematocrit : 35.7 %  Platelet Count - Automated : 193 K/uL  Mean Cell Volume : 90.8 fl  Mean Cell Hemoglobin : 29.5 pg  Mean Cell Hemoglobin Concentration : 32.5 gm/dL  Auto Neutrophil # : 5.13 K/uL  Auto Lymphocyte # : 1.86 K/uL  Auto Monocyte # : 0.76 K/uL  Auto Eosinophil # : 0.19 K/uL  Auto Basophil # : 0.03 K/uL  Auto Neutrophil % : 64.0 %  Auto Lymphocyte % : 23.2 %  Auto Monocyte % : 9.5 %  Auto Eosinophil % : 2.4 %  Auto Basophil % : 0.4 %        140  |  108  |  19  ----------------------------<  112<H>  3.4<L>   |  22  |  1.18      143  |  108  |  20  ----------------------------<  139<H>  4.2   |  23  |  1.18    Ca    8.7      29 Sep 2020 04:08  Ca    9.2      28 Sep 2020 17:13  Phos  3.2       Phos  2.7       Mg     2.3       Mg     1.8         TPro  5.7<L>  /  Alb  3.1<L>  /  TBili  0.6  /  DBili  x   /  AST  12  /  ALT  19  /  AlkPhos  93    TPro  6.8  /  Alb  4.0  /  TBili  0.5  /  DBili  x   /  AST  20  /  ALT  25  /  AlkPhos  114      proBNP: Serum Pro-Brain Natriuretic Peptide: 3408 pg/mL ( @ 17:13)    CARDIAC MARKERS:      Creatine Kinase, Serum: 42 U/L (20 @ 04:08)  Creatine Kinase, Serum: 61 U/L (20 @ 17:13)      TELEMETRY:   	    EC;1 AVB QRS ~ 120 ms    RADIOLOGY:  eo< from: TTE with Doppler (w/Cont) (20 @ 16:39) >  ------------------------------------------------------------------------  Dimensions:    Normal Values:  LA:     4.5    2.0 - 4.0 cm  Ao:     3.3    2.0 - 3.8 cm  SEPTUM: 1.1    0.6 - 1.2 cm  PWT:    0.9    0.6 - 1.1 cm  LVIDd:  4.6    3.0 - 5.6 cm  LVIDs:  2.4    1.8 - 4.0 cm  Derived variables:  LVMI: 91 g/m2  RWT: 0.39  Fractional short: 48 %  EF (Visual Estimate): 55-60 %  Doppler Peak Velocity (m/sec): AoV=1.8  ------------------------------------------------------------------------  Observations:  Mitral Valve: Normal mitral valve. Mild mitral  regurgitation.  Aortic Valve/Aorta: Calcified trileaflet aortic valve with  normal opening. Peak transaortic valve gradient equals 13  mm Hg, mean transaortic valve gradient equals 7 mm Hg,  aortic valve velocity time integral equals 49 cm. Minimal  aortic regurgitation.  Peak left ventricular outflow tract  gradient equals 5 mm Hg, LVOT velocity time integral equals  26 cm.  Aortic Root: 3.3 cm.  Left Atrium: Mildly dilated left atrium.LA volume index =  41 cc/m2.  Left Ventricle: Endocardial visualization enhanced with  intravenous injection of Ultrasonic Enhancing Agent  (Definity). Overall preserved left ventricular systolic  function. The mid lateral and mid inferolateral wallsare  hypokinetic. The apex is dyskinetic. No left ventricular  thrombus. Normal left ventricular internal dimensions and  wall thicknesses. Indeterminate diastolic function.  Right Heart: Normal right atrium. The right ventricle is  not well visualized; grossly normal right ventricular  systolic function. Normal tricuspid valve. Mild tricuspid  regurgitation. Pulmonic valve not well visualized.  Pericardium/Pleura: Normal pericardium with no pericardial  effusion.  Hemodynamic: Estimated right atrial pressure is 8 mm Hg.  Estimated right ventricular systolic pressure equals 49 mm  Hg, assuming right atrial pressure equals 8 mm Hg,  consistent with mild pulmonary hypertension.  ------------------------------------------------------------------------  Conclusions:  Patient bradycardic to HRabout 40 bpm during the exam.  1. Normal mitral valve. Mild mitral regurgitation.  2. Calcified trileaflet aortic valve with normal opening.  Minimal aortic regurgitation.  3. Mildly dilated left atrium.LA volume index = 41 cc/m2.  4. Endocardial visualization enhanced with intravenous  injection of Ultrasonic Enhancing Agent (Definity).  Overall preserved left ventricular systolic function. The  mid lateral and mid inferolateral walls are hypokinetic.  The apex is dyskinetic. No left ventricular thrombus.  5. Estimated right ventricular systolic pressure equals 49  mm Hg, assuming right atrial pressure equals 8 mm Hg,  consistent with mild pulmonary hypertension.  *** No previous Echo exam.  ------------------------------------------------------------------------  Confirmed on  2020 - 08:54:07 by Titus Martinez M.D.  ----------------------------------------------------

## 2020-09-29 NOTE — PROGRESS NOTE ADULT - ASSESSMENT
75 year old Obese former smoking male, primary language Macedonian PMHx asthma, T2DM, HTN, HLD CAD s/p vessel  CABG (2016)  transfer to [facility] for symptomatic bradycardia dizziness, weakness, chest tightness, and JACOBO for 2 weeks. EKG with CHB with ventricular escape rate in the 40's    # 1 2:1 AV block wide QRS   - monitor on telemetry   - keep NPO for pacemaker today   - ECHO with normal EF 55- 60 %  - no Heparin products pre or post op   - dispo  follow up in EP office for post op visit  [phone]     75 year old Obese former smoking male, primary language Romansh PMHx asthma, T2DM, HTN, HLD CAD s/p vessel  CABG (2016)  transfer to [facility] for symptomatic bradycardia dizziness, weakness, chest tightness, and JACOBO for 2 weeks. EKG with CHB with ventricular escape rate in the 40's    # 1 2:1 AV block wide QRS   - monitor on telemetry   - keep NPO for pacemaker today   - ECHO with normal EF 55- 60 %  - no Heparin products pre or post op   - dispo  follow up in EP office for post op visit on Oct 8 at 3:20 pm  [phone]  - to have post op chest xray check lead tip placement and likely will be cleared for Discharge from EP perspective   [id number]     75 year old Obese former smoking male, primary language Italian PMHx asthma, T2DM, HTN, HLD CAD s/p vessel  CABG (2016)  transfer to [facility] for symptomatic bradycardia dizziness, weakness, chest tightness, and JACOBO for 2 weeks. EKG with CHB with ventricular escape rate in the 40's    # 1 2:1 AV block wide QRS   - monitor on telemetry   - keep NPO for pacemaker today   - ECHO with normal EF 55- 60 %  - no Heparin products pre or post op   - dispo  follow up in EP office for post op visit on Oct 6 at            pm  [phone]  - to have post op chest xray check lead tip placement and likely will be cleared for Discharge from EP perspective   [id number]  addendum  post device teaching done with patient and girlfriend   ID card and booklet given to patient   f/u chest xray 75 year old Obese former smoking male, primary language Ukrainian PMHx asthma, T2DM, HTN, HLD CAD s/p vessel  CABG (2016)  transfer to [facility] for symptomatic bradycardia dizziness, weakness, chest tightness, and JACOBO for 2 weeks. EKG with CHB with ventricular escape rate in the 40's    # 1 2:1 AV block wide QRS   - monitor on telemetry   - keep NPO for pacemaker today   - ECHO with normal EF 55- 60 %  - no Heparin products pre or post op   - dispo  follow up in EP office for post op visit on Oct 6 at  8:40 am  [phone]  - to have post op chest xray check lead tip placement and likely will be cleared for Discharge from EP perspective   [id number]  addendum:  chest xray with good lead tip placement  device paired and checked by representative  post device teaching done with patient and girlfriend   ID card and booklet given to patient   f/u chest xray 75 year old Obese former smoking male, primary language French PMHx asthma, T2DM, HTN, HLD CAD s/p vessel  CABG (2016)  transfer to [facility] for symptomatic bradycardia dizziness, weakness, chest tightness, and JACOBO for 2 weeks. EKG with CHB with ventricular escape rate in the 40's    # 1 2:1 AV block wide QRS   - monitor on telemetry   - keep NPO for pacemaker today   - ECHO with normal EF 55- 60 %  - no Heparin products pre or post op   - dispo  follow up in EP office for post op visit on Oct 6 at  8:40 am  [phone]  - to have post op chest xray check lead tip placement and likely will be cleared for Discharge from EP perspective   [id number]  addendum:  device paired and checked by representative  post device teaching done with patient and girlfriend   ID card and booklet given to patient   chest xray with good lead tip placement, await official read if no pathology clear from EP perspective for discharge home

## 2020-09-29 NOTE — PROGRESS NOTE ADULT - SUBJECTIVE AND OBJECTIVE BOX
====================  CCU MIDNIGHT ROUNDS  ====================    PARAMJIT LEVY  [id number]  Patient is a 75y old  Male who presents with a chief complaint of Bradycardia (28 Sep 2020 18:00)      ====================  SUMMARY:  75M PMHx asthma, HTN, DM, CAD s/p CABG (2013) presents from OSH for symptomatic bradycardia. Reports having felt lightheadedness, dizziness, weakness, chest tightness, and shortness of breath with exertion for 3 days. Yesterday, he missed Jew because he was feeling ill. Reports feeling like he was having an asthma attack. This AM, went to his doctor, who sent him to [facility] due to bradycardia. At OSH, noted to be bradycardic to 40, BP 150s/60s. Received nebulizer for asthma, reports he felt better after this. [facility].     On arrival, T 97.7, HR 44, /76, RR 21, SpO2 99% on RA.  ====================        ====================  NEW EVENTS:  Admitted to CICU for AV 2:1  ====================        ====================  VITALS (Last 12 hrs):  ====================    T(C): 37.2 (09-28-20 @ 23:00), Max: 37.3 (09-28-20 @ 19:00)  T(F): 98.9 (09-28-20 @ 23:00), Max: 99.2 (09-28-20 @ 19:00)  HR: 40 (09-29-20 @ 00:00) (40 - 44)  BP: 161/60 (09-29-20 @ 00:00) (115/49 - 191/76)  BP(mean): 105 (09-29-20 @ 00:00) (74 - 126)  ABP: --  ABP(mean): --  RR: 21 (09-29-20 @ 00:00) (18 - 29)  SpO2: 97% (09-29-20 @ 00:00) (96% - 99%)  Wt(kg): --  CVP(mm Hg): --  CVP(cm H2O): --  CO: --  CI: --  PA: --  PA(mean): --  PCWP: --  SVR: --  PVR: --    I&O's Summary    28 Sep 2020 07:01  -  29 Sep 2020 01:24  --------------------------------------------------------  IN: 250 mL / OUT: 300 mL / NET: -50 mL            ====================  NEW LABS:  ====================                          12.9   9.53  )-----------( 227      ( 28 Sep 2020 17:13 )             41.1     09-28    143  |  108  |  20  ----------------------------<  139<H>  4.2   |  23  |  1.18    Ca    9.2      28 Sep 2020 17:13  Phos  2.7     09-28  Mg     1.8     09-28    TPro  6.8  /  Alb  4.0  /  TBili  0.5  /  DBili  x   /  AST  20  /  ALT  25  /  AlkPhos  114  09-28    PT/INR - ( 28 Sep 2020 17:17 )   PT: 13.2 sec;   INR: 1.12 ratio         PTT - ( 28 Sep 2020 17:17 )  PTT:35.4 sec  Creatine Kinase, Serum: 61 U/L (09-28-20 @ 17:13)    CKMB Units: 2.4 ng/mL (09-28 @ 17:13)          ====================  PLAN:  ====================  -       Carolina Hathaway PA-C  CCU/Cardiology ====================  CCU MIDNIGHT ROUNDS  ====================    PARAMJIT LEVY  [id number]  Patient is a 75y old  Male who presents with a chief complaint of Bradycardia (28 Sep 2020 18:00)      ====================  SUMMARY:  75M PMHx asthma, HTN, DM, CAD s/p CABG (2013) presents from OSH for symptomatic bradycardia. Reports having felt lightheadedness, dizziness, weakness, chest tightness, and shortness of breath with exertion for 3 days. Yesterday, he missed [employer] because he was feeling ill. Reports feeling like he was having an asthma attack. This AM, went to his doctor, who sent him to [facility] due to bradycardia. At OSH, noted to be bradycardic to 40, BP 150s/60s. Received nebulizer for asthma, reports he felt better after this. Transferred to [facility] CICU for EP eval.     On arrival, T 97.7, HR 44, /76, RR 21, SpO2 99% on RA.  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  NPO after midnight for PPM 9/29  ====================        ====================  VITALS (Last 12 hrs):  ====================    T(C): 37.2 (09-28-20 @ 23:00), Max: 37.3 (09-28-20 @ 19:00)  T(F): 98.9 (09-28-20 @ 23:00), Max: 99.2 (09-28-20 @ 19:00)  HR: 40 (09-29-20 @ 00:00) (40 - 44)  BP: 161/60 (09-29-20 @ 00:00) (115/49 - 191/76)  BP(mean): 105 (09-29-20 @ 00:00) (74 - 126)  RR: 21 (09-29-20 @ 00:00) (18 - 29)  SpO2: 97% (09-29-20 @ 00:00) (96% - 99%)      I&O's Summary    28 Sep 2020 07:01  -  29 Sep 2020 01:24  --------------------------------------------------------  IN: 250 mL / OUT: 300 mL / NET: -50 mL            ====================  NEW LABS:  ====================                          12.9   9.53  )-----------( 227      ( 28 Sep 2020 17:13 )             41.1     09-28    143  |  108  |  20  ----------------------------<  139<H>  4.2   |  23  |  1.18    Ca    9.2      28 Sep 2020 17:13  Phos  2.7     09-28  Mg     1.8     09-28    TPro  6.8  /  Alb  4.0  /  TBili  0.5  /  DBili  x   /  AST  20  /  ALT  25  /  AlkPhos  114  09-28    PT/INR - ( 28 Sep 2020 17:17 )   PT: 13.2 sec;   INR: 1.12 ratio         PTT - ( 28 Sep 2020 17:17 )  PTT:35.4 sec  Creatine Kinase, Serum: 61 U/L (09-28-20 @ 17:13)    CKMB Units: 2.4 ng/mL (09-28 @ 17:13)          ====================  PLAN:  ====================  75M PMHx HTN, DM, CAD s/p CABG (2013), asthma presents with symptomatic bradycardia concerning for high grade AV block.     #Neuro  - A&Ox3. continue to monitor mental status as per protocol   - no active issues     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, LAFB, RBBB. Continue to monitor daily EKG  - EP evaluation, PPM in AM  - NPO for PPM placement  - F/u TTE read  - Maintain Pacemaker pads on patient  - Atropine at bedside. Currently HR 30s-40s. HD stable with -150s. Continue to monitor vitals closely.     HTN  - Hold all AVN blocking agents (BB)  - Hold home ACE-I for now  - Monitor BPs- will use IV hydralazine for bp control if SBP >170.   - 20 mg IV Lasix  - Hypervolemic on exam with elevated pro-BNP    CAD s/p CABG  - C/w Atorvastatin 40  - C/w ASA/Plavix    #Respiratory  Asthma  - c/w Home inhaler therapeutic interchange  - 97% on room air, breathing comfortably. continue to monitor SpO2    #GI  No acute issues  - NPO after midnight    #Renal  DANYA  - Cr 1.2, no known kidney disease. Will continue to monitor and trend with AM labs.   - Monitor, avoid nephrotoxic agents  - Hold home lisinopril 20  - s.p lasix 20 IVP with 300 of UOP overnight- will continue to monitor I&Os and give additional diuresis as needed.     #ID  - afebrile and no leukocytosis. So s/s of infection. continue to monitor and trend     #Endo  Type 2 Diabetes without insulin  - SSI  - F/u HbA1c    TSH WNL at OSH    #Heme  - Hold DVT PPx for PPM placement  - Per EP, okay with ASA/Plavix for PPM placement        Carolina Hathaway PA-C  CCU/Cardiology

## 2020-10-06 ENCOUNTER — APPOINTMENT (OUTPATIENT)
Dept: ELECTROPHYSIOLOGY | Facility: CLINIC | Age: 75
End: 2020-10-06
Payer: MEDICARE

## 2020-10-06 ENCOUNTER — NON-APPOINTMENT (OUTPATIENT)
Age: 75
End: 2020-10-06

## 2020-10-06 VITALS
WEIGHT: 172 LBS | SYSTOLIC BLOOD PRESSURE: 140 MMHG | OXYGEN SATURATION: 99 % | HEART RATE: 59 BPM | BODY MASS INDEX: 27.64 KG/M2 | HEIGHT: 66 IN | DIASTOLIC BLOOD PRESSURE: 80 MMHG

## 2020-10-06 DIAGNOSIS — I44.2 ATRIOVENTRICULAR BLOCK, COMPLETE: ICD-10-CM

## 2020-10-06 PROBLEM — E11.9 TYPE 2 DIABETES MELLITUS WITHOUT COMPLICATIONS: Chronic | Status: ACTIVE | Noted: 2020-09-28

## 2020-10-06 PROBLEM — H26.9 UNSPECIFIED CATARACT: Chronic | Status: ACTIVE | Noted: 2020-09-28

## 2020-10-06 PROBLEM — J45.909 UNSPECIFIED ASTHMA, UNCOMPLICATED: Chronic | Status: ACTIVE | Noted: 2020-09-28

## 2020-10-06 PROCEDURE — 99024 POSTOP FOLLOW-UP VISIT: CPT

## 2020-10-06 PROCEDURE — 93280 PM DEVICE PROGR EVAL DUAL: CPT

## 2020-10-08 ENCOUNTER — APPOINTMENT (OUTPATIENT)
Dept: ELECTROPHYSIOLOGY | Facility: CLINIC | Age: 75
End: 2020-10-08

## 2020-12-17 NOTE — PRE-ANESTHESIA EVALUATION ADULT - NSANTHDIETYNSD_GEN_ALL_CORE
62M PMHx ESRD on HD MWF ([facility]), HTN, diabetes - admitted L foot gangrene.  Pt report pain initially mild however progressively worsened to mod-severe, gradual onset, now with discoloration L foot.  Patient saw his podiatrist Dr. Mendez day of admission who referred to ED.  Pt denies any fever, chills, chest pain, shortness of breath, cough, nausea, vomiting, diarrhea.        # ESRD  Pt. with ESRD on HD three times a week (MWF @ [facility] HD). Last HD was on 12/16/20 via [facility]. Pt clinically stable. Labs reviewed.   - Will arrange for maintenance HD tomorrow. Monitor labs, weight, renal diet, renally dose medication per HD      # Hypertension  BP in acceptable range. Monitor BP on current BP medications. Low salt diet advised      # Renal bone disease  Pt. with hyperphosphatemia in the setting of ESRD.   - resume home phoslo TID with meals. Check intact PTH level. Low phosphorus diet advised. Monitor serum phosphorus   62M PMHx ESRD on HD MWF ([facility]), HTN, diabetes - admitted L foot gangrene.  Nephrology consulted for ESRD on HD.        # ESRD  Pt. with ESRD on HD (since 04/2016) three times a week (MWF @ [facility], currently in [facility]). Last HD was on 12/16/20 via [facility]. Pt clinically stable. Labs reviewed.   - Will arrange for maintenance HD tomorrow. Monitor labs, weight, renal diet, fluid restriction, renally dose medication per HD    # Hypertension  BP in acceptable range. Monitor BP on current BP medications. Low salt diet advised    # Renal bone disease  Pt. with hyperphosphatemia in the setting of ESRD.   - resume home phoslo TID with meals. Check intact PTH level. Low phosphorus diet advised. Monitor serum phosphorus   Yes

## 2021-01-06 ENCOUNTER — APPOINTMENT (OUTPATIENT)
Dept: ELECTROPHYSIOLOGY | Facility: CLINIC | Age: 76
End: 2021-01-06

## 2021-07-07 ENCOUNTER — APPOINTMENT (OUTPATIENT)
Dept: ELECTROPHYSIOLOGY | Facility: CLINIC | Age: 76
End: 2021-07-07
Payer: MEDICARE

## 2021-07-07 ENCOUNTER — NON-APPOINTMENT (OUTPATIENT)
Age: 76
End: 2021-07-07

## 2021-07-07 PROCEDURE — 93294 REM INTERROG EVL PM/LDLS PM: CPT

## 2021-07-07 PROCEDURE — 93296 REM INTERROG EVL PM/IDS: CPT

## 2021-10-06 ENCOUNTER — NON-APPOINTMENT (OUTPATIENT)
Age: 76
End: 2021-10-06

## 2021-10-06 ENCOUNTER — APPOINTMENT (OUTPATIENT)
Dept: ELECTROPHYSIOLOGY | Facility: CLINIC | Age: 76
End: 2021-10-06
Payer: MEDICARE

## 2021-10-06 PROCEDURE — 93294 REM INTERROG EVL PM/LDLS PM: CPT

## 2021-10-06 PROCEDURE — 93296 REM INTERROG EVL PM/IDS: CPT

## 2022-01-04 ENCOUNTER — NON-APPOINTMENT (OUTPATIENT)
Age: 77
End: 2022-01-04

## 2022-01-05 ENCOUNTER — APPOINTMENT (OUTPATIENT)
Dept: ELECTROPHYSIOLOGY | Facility: CLINIC | Age: 77
End: 2022-01-05
Payer: MEDICARE

## 2022-01-05 PROCEDURE — 93294 REM INTERROG EVL PM/LDLS PM: CPT

## 2022-01-05 PROCEDURE — 93296 REM INTERROG EVL PM/IDS: CPT

## 2022-04-06 ENCOUNTER — NON-APPOINTMENT (OUTPATIENT)
Age: 77
End: 2022-04-06

## 2022-04-06 ENCOUNTER — APPOINTMENT (OUTPATIENT)
Dept: ELECTROPHYSIOLOGY | Facility: CLINIC | Age: 77
End: 2022-04-06
Payer: MEDICARE

## 2022-04-06 PROCEDURE — 93296 REM INTERROG EVL PM/IDS: CPT

## 2022-04-06 PROCEDURE — 93294 REM INTERROG EVL PM/LDLS PM: CPT

## 2022-05-25 ENCOUNTER — APPOINTMENT (OUTPATIENT)
Dept: ELECTROPHYSIOLOGY | Facility: CLINIC | Age: 77
End: 2022-05-25

## 2022-07-07 ENCOUNTER — APPOINTMENT (OUTPATIENT)
Dept: ELECTROPHYSIOLOGY | Facility: CLINIC | Age: 77
End: 2022-07-07

## 2022-07-07 ENCOUNTER — NON-APPOINTMENT (OUTPATIENT)
Age: 77
End: 2022-07-07

## 2022-07-07 PROCEDURE — 93294 REM INTERROG EVL PM/LDLS PM: CPT

## 2022-07-07 PROCEDURE — 93296 REM INTERROG EVL PM/IDS: CPT

## 2022-10-06 ENCOUNTER — APPOINTMENT (OUTPATIENT)
Dept: ELECTROPHYSIOLOGY | Facility: CLINIC | Age: 77
End: 2022-10-06

## 2022-10-06 ENCOUNTER — NON-APPOINTMENT (OUTPATIENT)
Age: 77
End: 2022-10-06

## 2022-10-06 PROCEDURE — 93294 REM INTERROG EVL PM/LDLS PM: CPT

## 2022-10-06 PROCEDURE — 93296 REM INTERROG EVL PM/IDS: CPT

## 2023-01-05 ENCOUNTER — FORM ENCOUNTER (OUTPATIENT)
Age: 78
End: 2023-01-05

## 2023-01-05 ENCOUNTER — APPOINTMENT (OUTPATIENT)
Dept: ELECTROPHYSIOLOGY | Facility: CLINIC | Age: 78
End: 2023-01-05

## 2023-02-06 ENCOUNTER — APPOINTMENT (OUTPATIENT)
Dept: ELECTROPHYSIOLOGY | Facility: CLINIC | Age: 78
End: 2023-02-06
Payer: MEDICARE

## 2023-02-06 ENCOUNTER — NON-APPOINTMENT (OUTPATIENT)
Age: 78
End: 2023-02-06

## 2023-02-06 PROCEDURE — 93296 REM INTERROG EVL PM/IDS: CPT

## 2023-02-06 PROCEDURE — 93294 REM INTERROG EVL PM/LDLS PM: CPT

## 2023-05-08 ENCOUNTER — NON-APPOINTMENT (OUTPATIENT)
Age: 78
End: 2023-05-08

## 2023-05-08 ENCOUNTER — APPOINTMENT (OUTPATIENT)
Dept: ELECTROPHYSIOLOGY | Facility: CLINIC | Age: 78
End: 2023-05-08
Payer: MEDICARE

## 2023-05-08 PROCEDURE — 93294 REM INTERROG EVL PM/LDLS PM: CPT

## 2023-05-08 PROCEDURE — 93296 REM INTERROG EVL PM/IDS: CPT

## 2023-08-08 ENCOUNTER — APPOINTMENT (OUTPATIENT)
Dept: ELECTROPHYSIOLOGY | Facility: CLINIC | Age: 78
End: 2023-08-08
Payer: MEDICARE

## 2023-08-08 ENCOUNTER — NON-APPOINTMENT (OUTPATIENT)
Age: 78
End: 2023-08-08

## 2023-08-08 PROCEDURE — 93294 REM INTERROG EVL PM/LDLS PM: CPT

## 2023-08-08 PROCEDURE — 93296 REM INTERROG EVL PM/IDS: CPT

## 2023-11-07 ENCOUNTER — NON-APPOINTMENT (OUTPATIENT)
Age: 78
End: 2023-11-07

## 2023-11-07 ENCOUNTER — APPOINTMENT (OUTPATIENT)
Dept: ELECTROPHYSIOLOGY | Facility: CLINIC | Age: 78
End: 2023-11-07
Payer: MEDICARE

## 2023-11-07 PROCEDURE — 93294 REM INTERROG EVL PM/LDLS PM: CPT

## 2023-11-07 PROCEDURE — 93296 REM INTERROG EVL PM/IDS: CPT

## 2024-02-06 ENCOUNTER — APPOINTMENT (OUTPATIENT)
Dept: ELECTROPHYSIOLOGY | Facility: CLINIC | Age: 79
End: 2024-02-06
Payer: MEDICARE

## 2024-02-06 ENCOUNTER — NON-APPOINTMENT (OUTPATIENT)
Age: 79
End: 2024-02-06

## 2024-02-06 PROCEDURE — 93296 REM INTERROG EVL PM/IDS: CPT

## 2024-02-06 PROCEDURE — 93294 REM INTERROG EVL PM/LDLS PM: CPT

## 2024-05-08 ENCOUNTER — NON-APPOINTMENT (OUTPATIENT)
Age: 79
End: 2024-05-08

## 2024-05-08 ENCOUNTER — APPOINTMENT (OUTPATIENT)
Dept: ELECTROPHYSIOLOGY | Facility: CLINIC | Age: 79
End: 2024-05-08
Payer: MEDICARE

## 2024-05-08 PROCEDURE — 93294 REM INTERROG EVL PM/LDLS PM: CPT

## 2024-05-08 PROCEDURE — 93296 REM INTERROG EVL PM/IDS: CPT

## 2024-08-06 ENCOUNTER — NON-APPOINTMENT (OUTPATIENT)
Age: 79
End: 2024-08-06

## 2024-08-07 ENCOUNTER — APPOINTMENT (OUTPATIENT)
Dept: ELECTROPHYSIOLOGY | Facility: CLINIC | Age: 79
End: 2024-08-07

## 2024-08-07 PROCEDURE — 93296 REM INTERROG EVL PM/IDS: CPT

## 2024-08-07 PROCEDURE — 93294 REM INTERROG EVL PM/LDLS PM: CPT

## 2024-11-06 ENCOUNTER — NON-APPOINTMENT (OUTPATIENT)
Age: 79
End: 2024-11-06

## 2024-11-06 ENCOUNTER — APPOINTMENT (OUTPATIENT)
Dept: ELECTROPHYSIOLOGY | Facility: CLINIC | Age: 79
End: 2024-11-06
Payer: MEDICARE

## 2024-11-06 PROCEDURE — 93294 REM INTERROG EVL PM/LDLS PM: CPT

## 2024-11-06 PROCEDURE — 93296 REM INTERROG EVL PM/IDS: CPT

## 2025-02-05 ENCOUNTER — NON-APPOINTMENT (OUTPATIENT)
Age: 80
End: 2025-02-05

## 2025-02-05 ENCOUNTER — APPOINTMENT (OUTPATIENT)
Dept: ELECTROPHYSIOLOGY | Facility: CLINIC | Age: 80
End: 2025-02-05
Payer: MEDICARE

## 2025-02-05 PROCEDURE — 93296 REM INTERROG EVL PM/IDS: CPT

## 2025-02-05 PROCEDURE — 93294 REM INTERROG EVL PM/LDLS PM: CPT

## 2025-05-07 ENCOUNTER — APPOINTMENT (OUTPATIENT)
Dept: ELECTROPHYSIOLOGY | Facility: CLINIC | Age: 80
End: 2025-05-07

## 2025-05-07 PROCEDURE — 93296 REM INTERROG EVL PM/IDS: CPT

## 2025-05-07 PROCEDURE — 93294 REM INTERROG EVL PM/LDLS PM: CPT

## 2025-05-28 NOTE — DISCHARGE NOTE PROVIDER - NSDCCAREPROVSEEN_GEN_ALL_CORE_FT
Patient called to cancel appointment for today 5/28/25 at 8 AM with Dr. Saeed. Patient will call back to re-schedule.    [facility] Cardiac Electrophysiology, Team

## 2025-08-06 ENCOUNTER — APPOINTMENT (OUTPATIENT)
Dept: ELECTROPHYSIOLOGY | Facility: CLINIC | Age: 80
End: 2025-08-06
Payer: MEDICARE

## 2025-08-06 ENCOUNTER — NON-APPOINTMENT (OUTPATIENT)
Age: 80
End: 2025-08-06

## 2025-08-06 PROCEDURE — 93294 REM INTERROG EVL PM/LDLS PM: CPT

## 2025-08-06 PROCEDURE — 93296 REM INTERROG EVL PM/IDS: CPT
